# Patient Record
Sex: MALE | Race: WHITE | NOT HISPANIC OR LATINO | Employment: FULL TIME | URBAN - METROPOLITAN AREA
[De-identification: names, ages, dates, MRNs, and addresses within clinical notes are randomized per-mention and may not be internally consistent; named-entity substitution may affect disease eponyms.]

---

## 2023-12-23 ENCOUNTER — APPOINTMENT (OUTPATIENT)
Dept: RADIOLOGY | Facility: CLINIC | Age: 43
End: 2023-12-23
Attending: EMERGENCY MEDICINE
Payer: COMMERCIAL

## 2023-12-23 ENCOUNTER — HOSPITAL ENCOUNTER (EMERGENCY)
Facility: CLINIC | Age: 43
Discharge: HOME | End: 2023-12-23
Attending: EMERGENCY MEDICINE | Admitting: EMERGENCY MEDICINE
Payer: COMMERCIAL

## 2023-12-23 VITALS
WEIGHT: 280 LBS | HEART RATE: 82 BPM | BODY MASS INDEX: 34.1 KG/M2 | RESPIRATION RATE: 18 BRPM | SYSTOLIC BLOOD PRESSURE: 134 MMHG | HEIGHT: 76 IN | OXYGEN SATURATION: 97 % | DIASTOLIC BLOOD PRESSURE: 78 MMHG | TEMPERATURE: 97.9 F

## 2023-12-23 DIAGNOSIS — I48.3 TYPICAL ATRIAL FLUTTER (H): Primary | ICD-10-CM

## 2023-12-23 DIAGNOSIS — I48.92 ATRIAL FLUTTER WITH RAPID VENTRICULAR RESPONSE (H): ICD-10-CM

## 2023-12-23 LAB
ANION GAP SERPL CALCULATED.3IONS-SCNC: 11 MMOL/L (ref 7–15)
ATRIAL RATE - MUSE: 322 BPM
BASOPHILS # BLD AUTO: 0.1 10E3/UL (ref 0–0.2)
BASOPHILS NFR BLD AUTO: 1 %
BUN SERPL-MCNC: 10.8 MG/DL (ref 6–20)
CALCIUM SERPL-MCNC: 9.9 MG/DL (ref 8.6–10)
CHLORIDE SERPL-SCNC: 105 MMOL/L (ref 98–107)
CREAT SERPL-MCNC: 1.1 MG/DL (ref 0.67–1.17)
DEPRECATED HCO3 PLAS-SCNC: 26 MMOL/L (ref 22–29)
DIASTOLIC BLOOD PRESSURE - MUSE: 99 MMHG
EGFRCR SERPLBLD CKD-EPI 2021: 85 ML/MIN/1.73M2
EOSINOPHIL # BLD AUTO: 0.1 10E3/UL (ref 0–0.7)
EOSINOPHIL NFR BLD AUTO: 1 %
ERYTHROCYTE [DISTWIDTH] IN BLOOD BY AUTOMATED COUNT: 11.9 % (ref 10–15)
GLUCOSE SERPL-MCNC: 119 MG/DL (ref 70–99)
HCT VFR BLD AUTO: 49.9 % (ref 40–53)
HGB BLD-MCNC: 17.7 G/DL (ref 13.3–17.7)
HOLD SPECIMEN: NORMAL
HOLD SPECIMEN: NORMAL
IMM GRANULOCYTES # BLD: 0 10E3/UL
IMM GRANULOCYTES NFR BLD: 0 %
INTERPRETATION ECG - MUSE: NORMAL
LYMPHOCYTES # BLD AUTO: 2.2 10E3/UL (ref 0.8–5.3)
LYMPHOCYTES NFR BLD AUTO: 22 %
MAGNESIUM SERPL-MCNC: 2.1 MG/DL (ref 1.7–2.3)
MCH RBC QN AUTO: 32 PG (ref 26.5–33)
MCHC RBC AUTO-ENTMCNC: 35.5 G/DL (ref 31.5–36.5)
MCV RBC AUTO: 90 FL (ref 78–100)
MONOCYTES # BLD AUTO: 0.5 10E3/UL (ref 0–1.3)
MONOCYTES NFR BLD AUTO: 5 %
NEUTROPHILS # BLD AUTO: 7.1 10E3/UL (ref 1.6–8.3)
NEUTROPHILS NFR BLD AUTO: 71 %
NRBC # BLD AUTO: 0 10E3/UL
NRBC BLD AUTO-RTO: 0 /100
P AXIS - MUSE: 256 DEGREES
PLATELET # BLD AUTO: 223 10E3/UL (ref 150–450)
POTASSIUM SERPL-SCNC: 4 MMOL/L (ref 3.4–5.3)
PR INTERVAL - MUSE: NORMAL MS
QRS DURATION - MUSE: 80 MS
QT - MUSE: 228 MS
QTC - MUSE: 367 MS
R AXIS - MUSE: -64 DEGREES
RBC # BLD AUTO: 5.53 10E6/UL (ref 4.4–5.9)
SODIUM SERPL-SCNC: 142 MMOL/L (ref 135–145)
SYSTOLIC BLOOD PRESSURE - MUSE: 140 MMHG
T AXIS - MUSE: 6 DEGREES
TROPONIN T SERPL HS-MCNC: 11 NG/L
TSH SERPL DL<=0.005 MIU/L-ACNC: 2.09 UIU/ML (ref 0.3–4.2)
VENTRICULAR RATE- MUSE: 156 BPM
WBC # BLD AUTO: 10 10E3/UL (ref 4–11)

## 2023-12-23 PROCEDURE — 83735 ASSAY OF MAGNESIUM: CPT | Performed by: EMERGENCY MEDICINE

## 2023-12-23 PROCEDURE — 96374 THER/PROPH/DIAG INJ IV PUSH: CPT

## 2023-12-23 PROCEDURE — 84443 ASSAY THYROID STIM HORMONE: CPT | Performed by: EMERGENCY MEDICINE

## 2023-12-23 PROCEDURE — 80048 BASIC METABOLIC PNL TOTAL CA: CPT | Performed by: EMERGENCY MEDICINE

## 2023-12-23 PROCEDURE — 93005 ELECTROCARDIOGRAM TRACING: CPT | Performed by: EMERGENCY MEDICINE

## 2023-12-23 PROCEDURE — 99285 EMERGENCY DEPT VISIT HI MDM: CPT | Mod: 25

## 2023-12-23 PROCEDURE — 99152 MOD SED SAME PHYS/QHP 5/>YRS: CPT

## 2023-12-23 PROCEDURE — 92960 CARDIOVERSION ELECTRIC EXT: CPT

## 2023-12-23 PROCEDURE — 71046 X-RAY EXAM CHEST 2 VIEWS: CPT

## 2023-12-23 PROCEDURE — 258N000003 HC RX IP 258 OP 636: Performed by: EMERGENCY MEDICINE

## 2023-12-23 PROCEDURE — 84484 ASSAY OF TROPONIN QUANT: CPT | Performed by: EMERGENCY MEDICINE

## 2023-12-23 PROCEDURE — 999N000157 HC STATISTIC RCP TIME EA 10 MIN

## 2023-12-23 PROCEDURE — 250N000011 HC RX IP 250 OP 636: Performed by: EMERGENCY MEDICINE

## 2023-12-23 PROCEDURE — 36415 COLL VENOUS BLD VENIPUNCTURE: CPT | Performed by: EMERGENCY MEDICINE

## 2023-12-23 PROCEDURE — 85025 COMPLETE CBC W/AUTO DIFF WBC: CPT | Performed by: EMERGENCY MEDICINE

## 2023-12-23 PROCEDURE — 96361 HYDRATE IV INFUSION ADD-ON: CPT | Mod: 59

## 2023-12-23 RX ORDER — ETOMIDATE 2 MG/ML
10 INJECTION INTRAVENOUS ONCE
Status: DISCONTINUED | OUTPATIENT
Start: 2023-12-23 | End: 2023-12-23 | Stop reason: HOSPADM

## 2023-12-23 RX ORDER — PROPOFOL 10 MG/ML
INJECTION, EMULSION INTRAVENOUS
Status: DISCONTINUED
Start: 2023-12-23 | End: 2023-12-23 | Stop reason: HOSPADM

## 2023-12-23 RX ORDER — PROPOFOL 10 MG/ML
INJECTION, EMULSION INTRAVENOUS PRN
Status: COMPLETED | OUTPATIENT
Start: 2023-12-23 | End: 2023-12-23

## 2023-12-23 RX ORDER — DILTIAZEM HYDROCHLORIDE 5 MG/ML
15 INJECTION INTRAVENOUS ONCE
Status: COMPLETED | OUTPATIENT
Start: 2023-12-23 | End: 2023-12-23

## 2023-12-23 RX ADMIN — PROPOFOL 30 MG: 10 INJECTION, EMULSION INTRAVENOUS at 17:43

## 2023-12-23 RX ADMIN — PROPOFOL 30 MG: 10 INJECTION, EMULSION INTRAVENOUS at 17:47

## 2023-12-23 RX ADMIN — PROPOFOL 30 MG: 10 INJECTION, EMULSION INTRAVENOUS at 17:41

## 2023-12-23 RX ADMIN — PROPOFOL 30 MG: 10 INJECTION, EMULSION INTRAVENOUS at 17:42

## 2023-12-23 RX ADMIN — PROPOFOL 30 MG: 10 INJECTION, EMULSION INTRAVENOUS at 17:44

## 2023-12-23 RX ADMIN — PROPOFOL 30 MG: 10 INJECTION, EMULSION INTRAVENOUS at 17:45

## 2023-12-23 RX ADMIN — PROPOFOL 30 MG: 10 INJECTION, EMULSION INTRAVENOUS at 17:39

## 2023-12-23 RX ADMIN — PROPOFOL 30 MG: 10 INJECTION, EMULSION INTRAVENOUS at 17:40

## 2023-12-23 RX ADMIN — SODIUM CHLORIDE 1000 ML: 9 INJECTION, SOLUTION INTRAVENOUS at 15:37

## 2023-12-23 RX ADMIN — PROPOFOL 30 MG: 10 INJECTION, EMULSION INTRAVENOUS at 17:46

## 2023-12-23 RX ADMIN — PROPOFOL 60 MG: 10 INJECTION, EMULSION INTRAVENOUS at 17:37

## 2023-12-23 RX ADMIN — DILTIAZEM HYDROCHLORIDE 15 MG: 5 INJECTION INTRAVENOUS at 15:59

## 2023-12-23 RX ADMIN — PROPOFOL 30 MG: 10 INJECTION, EMULSION INTRAVENOUS at 17:48

## 2023-12-23 ASSESSMENT — ACTIVITIES OF DAILY LIVING (ADL): ADLS_ACUITY_SCORE: 35

## 2023-12-23 NOTE — ED NOTES
Patient Discharged to home. Discharge instructions reviewed and signed by the patient. All personal belongings removed from the room.   Shay Marmolejo RN  12/23/2023  6:47 PM

## 2023-12-23 NOTE — ED PROVIDER NOTES
EMERGENCY DEPARTMENT ENCOUNTER      NAME: Krystian Ortega  AGE: 43 year old male  YOB: 1980  MRN: [id number]  EVALUATION DATE & TIME: 12/23/2023  3:24 PM    PCP: No primary care provider on file.    ED PROVIDER: Carloz Jay M.D.      Chief Complaint   Patient presents with    Shortness of Breath    Palpitations         FINAL IMPRESSION:  1. Atrial flutter with rapid ventricular response (H)          ED COURSE & MEDICAL DECISION MAKING:      3:33 pm I met with the patient.   4:39 PM I spoke to Dr. Garcia from cardiology regarding patient.   4:42 I updated patient on cardio findings and asking patient consent for cardioversion.   5:30 PM I performed a cardioversion on patient.   6:31 PM repeat exam is benign discussed findings and discharge close follow-up.    Pertinent Labs & Imaging studies reviewed. (See chart for details)  43 year old male presents to the Emergency Department for evaluation of palpitations, shortness of breath. Patient appears non toxic with stable vitals signs, patient afebrile, he is tachycardic, no hypoxia.  Exam significant for tachycardia though regular rhythm.  Patient denies chest pain, pleurisy, no reports of any ripping or tearing chest discomfort the back or shoulders, fevers, hemoptysis or productive cough.  Per review of the medical record did review office visit through [facility] on 2/20/2015 where patient was seen by family medicine for contraception counseling, desired vasectomy, otherwise no other significant past medical history.  Patient denies any tobacco use.  ECG obtained from triage consistent with atrial flutter, no ischemic changes.  We will obtain screening labs and a chest x-ray.  Patient states that symptoms started around 730 or 8:00 this morning.    Reassessment: Labs by my independent interpretation showed no acute concerning findings, troponin within normal limits with certainly nothing just ACS, no signs of anemia with a  hemoglobin 7.7 no signs of acute kidney injury with a creatinine of 1.10.  No signs of endocrine dysfunction with a pH of 2.09.  Chest x-ray by my independent interpretation showed no focal consolidation by report was negative.  Discussed patient case with cardiology and at this time they agree patient can be cardioverted and discharged with Xarelto and then close follow-up in the cardiology clinic.  Discussed these findings recommendations with the patient we did obtain written consent.  Bedside procedural sedation and synchronized cardioversion performed with return to normal sinus rhythm.  Patient was alert and awake, tolerating p.o. so will be discharged with Xarelto prescription and close follow-up with cardiology.  Discussed all these findings recommendations the patient felt reassured and comfortable discharge.  Return precautions provided.    Medical Decision Making    History:  Supplemental history from: Documented in chart, if applicable  External Record(s) reviewed: Documented in chart, if applicable.    Work Up:  Chart documentation includes differential considered and any EKGs or imaging independently interpreted by provider, where specified.  In additional to work up documented, I considered the following work up: Documented in chart, if applicable.    External consultation:  Discussion of management with another provider: Documented in chart, if applicable    Complicating factors:  Care impacted by chronic illness: N/A  Care affected by social determinants of health: N/A    Disposition considerations: Discharge. I prescribed additional prescription strength medication(s) as charted. See documentation for any additional details.          At the conclusion of the encounter I discussed the results of all of the tests and the disposition. The questions were answered and return precautions provided. The patient or family acknowledged understanding and was agreeable with the care plan.         MEDICATIONS  "GIVEN IN THE EMERGENCY:  Medications   sodium chloride 0.9% BOLUS 1,000 mL (0 mLs Intravenous Stopped 12/23/23 1834)   diltiazem (CARDIZEM) injection 15 mg (15 mg Intravenous $Given 12/23/23 6672)   propofol (DIPRIVAN) injection 10 mg/mL vial (30 mg Intravenous $Given 12/23/23 1743)       NEW PRESCRIPTIONS STARTED AT TODAY'S ER VISIT  Discharge Medication List as of 12/23/2023  6:36 PM        START taking these medications    Details   Rivaroxaban ANTICOAGULANT 15 & 20 MG TBPK Starter Therapy Pack Take 15 mg by mouth 2 times daily (with meals) for 21 days, THEN 20 mg daily with food for 9 days., Disp-51 each, R-0, Local Print                  =================================================================    HPI    Patient information was obtained from: Patient    Use of Intrepreter: N/A      Krystian Ortega is a 43 year old male who presents to the ED via walk-in for evaluation of shortness of breath and palpitations.     The patient reports around 7:30 - 8 am this morning, he started endorsing heart palpitations and felt out of breath. Patient states he has symptoms like this in the past and they usually go away after a couple of hours. Today, the symptoms were persistent and subsided for a bit. In the afternoon, the patient reports walking up the stairs and feeling like he \"almost passed out\". He also endorses lightheadedness. Patient states the frequency of his symptoms occurs 5- 6 times a year and would last 1-2 hours. He is eating and drinking normally. Patient last ate 2-3 hours ago. This is his first time coming in for the symptoms. No medication taken and no urinary symptoms. No hx of diabetes, heart disease or lung disease. No tobacco use.  Patient denies any fever or pain. No other complaints right now.       REVIEW OF SYSTEMS   Constitutional:  Denies fever, chills  Respiratory:  Denies productive cough. Positive for shortness of breath.   Cardiovascular:  Denies chest pain. Positive for " palpitations  GI:  Denies abdominal pain, nausea, vomiting, or change in bowel or bladder habits   Musculoskeletal:  Denies any new muscle/joint swelling  Skin:  Denies rash   Neurologic:  Denies focal weakness. Positive for lightheadedness.   All systems negative except as marked.     PAST MEDICAL HISTORY:  History reviewed. No pertinent past medical history.    PAST SURGICAL HISTORY:  Past Surgical History:   Procedure Laterality Date    ORCHIOPEXY           CURRENT MEDICATIONS:    Prior to Admission medications    Medication Sig Start Date End Date Taking? Authorizing Provider   LORazepam (ATIVAN) 1 MG tablet [LORAZEPAM (ATIVAN) 1 MG TABLET] take 1 tablet 1-2 hours prior to procedure 2/20/15   Barney Cedeno MD        ALLERGIES:  No Known Allergies    FAMILY HISTORY:  Family History   Problem Relation Age of Onset    No Known Problems Mother     No Known Problems Father     No Known Problems Sister     No Known Problems Brother        SOCIAL HISTORY:   Social History     Socioeconomic History    Marital status:    Tobacco Use    Smoking status: Never   Substance and Sexual Activity    Alcohol use: Yes     Alcohol/week: 4.2 - 8.3 standard drinks of alcohol    Drug use: Yes     Comment: Drug use: nicotine tablets       VITALS:  Patient Vitals for the past 24 hrs:   BP Temp Temp src Pulse Resp SpO2 Height Weight   12/23/23 1835 -- 97.9  F (36.6  C) Oral -- -- -- -- --   12/23/23 1814 134/78 97.6  F (36.4  C) -- 82 18 97 % -- --   12/23/23 1802 118/75 -- -- -- 18 96 % -- --   12/23/23 1800 118/75 -- -- 86 25 96 % -- --   12/23/23 1757 120/73 -- -- 89 18 95 % -- --   12/23/23 1755 120/73 -- -- 92 (!) 32 96 % -- --   12/23/23 1745 118/69 -- -- (!) 147 21 96 % -- --   12/23/23 1735 (!) 158/82 -- -- (!) 146 18 100 % -- --   12/23/23 1730 -- -- -- -- 16 -- -- --   12/23/23 1730 137/82 -- -- (!) 145 24 100 % -- --   12/23/23 1728 -- -- -- (!) 151 19 99 % -- --   12/23/23 1725 133/82 -- -- (!) 149 24 99 % -- --  "  12/23/23 1717 133/65 98.6  F (37  C) Oral (!) 151 16 99 % -- --   12/23/23 1715 133/65 -- -- (!) 147 (!) 43 96 % -- --   12/23/23 1615 -- -- -- 88 17 99 % -- --   12/23/23 1610 -- -- -- 94 12 98 % -- --   12/23/23 1605 -- -- -- 108 30 95 % -- --   12/23/23 1600 (!) 138/95 -- -- (!) 149 22 97 % -- --   12/23/23 1555 -- -- -- (!) 155 24 98 % -- --   12/23/23 1550 -- -- -- (!) 156 17 96 % -- --   12/23/23 1545 (!) 154/110 -- -- (!) 155 24 98 % -- --   12/23/23 1540 -- -- -- (!) 159 24 98 % -- --   12/23/23 1535 -- -- -- (!) 157 14 99 % -- --   12/23/23 1530 (!) 150/110 -- -- (!) 160 27 99 % -- --   12/23/23 1525 (!) 140/99 98.4  F (36.9  C) Oral (!) 160 18 98 % 1.93 m (6' 4\") 127 kg (280 lb)        PHYSICAL EXAM    Constitutional:  Awake, alert, in no apparent distress  HENT:  Normocephalic, Atraumatic. Bilateral external ears normal. Oropharynx moist. Nose normal. Neck- Normal range of motion with no guarding, No midline cervical tenderness, Supple, No stridor.   Eyes:  PERRL, EOMI with no signs of entrapment, Conjunctiva normal, No discharge.   Respiratory:  Normal breath sounds, No respiratory distress, No wheezing.    Cardiovascular: Tachycardia with Normal rhythm, No appreciable rubs or gallops.   GI:  Soft, No tenderness, No distension, No palpable masses  Musculoskeletal:  Intact distal pulses, No edema. Good range of motion in all major joints. No tenderness to palpation or major deformities noted.  Integument:  Warm, Dry, No erythema, No rash.   Neurologic:  Alert & oriented, Normal motor function, Normal sensory function, No focal deficits noted.   Psychiatric:  Affect normal, Judgment normal, Mood normal.     LAB:  All pertinent labs reviewed and interpreted.  Results for orders placed or performed during the hospital encounter of 12/23/23   XR Chest 2 Views    Impression    IMPRESSION: Negative chest.   Basic metabolic panel   Result Value Ref Range    Sodium 142 135 - 145 mmol/L    Potassium 4.0 3.4 - " 5.3 mmol/L    Chloride 105 98 - 107 mmol/L    Carbon Dioxide (CO2) 26 22 - 29 mmol/L    Anion Gap 11 7 - 15 mmol/L    Urea Nitrogen 10.8 6.0 - 20.0 mg/dL    Creatinine 1.10 0.67 - 1.17 mg/dL    GFR Estimate 85 >60 mL/min/1.73m2    Calcium 9.9 8.6 - 10.0 mg/dL    Glucose 119 (H) 70 - 99 mg/dL   Result Value Ref Range    Troponin T, High Sensitivity 11 <=22 ng/L   TSH with free T4 reflex   Result Value Ref Range    TSH 2.09 0.30 - 4.20 uIU/mL   Extra Blue Top Tube   Result Value Ref Range    Hold Specimen JIC    Extra Red Top Tube   Result Value Ref Range    Hold Specimen JIC    CBC with platelets and differential   Result Value Ref Range    WBC Count 10.0 4.0 - 11.0 10e3/uL    RBC Count 5.53 4.40 - 5.90 10e6/uL    Hemoglobin 17.7 13.3 - 17.7 g/dL    Hematocrit 49.9 40.0 - 53.0 %    MCV 90 78 - 100 fL    MCH 32.0 26.5 - 33.0 pg    MCHC 35.5 31.5 - 36.5 g/dL    RDW 11.9 10.0 - 15.0 %    Platelet Count 223 150 - 450 10e3/uL    % Neutrophils 71 %    % Lymphocytes 22 %    % Monocytes 5 %    % Eosinophils 1 %    % Basophils 1 %    % Immature Granulocytes 0 %    NRBCs per 100 WBC 0 <1 /100    Absolute Neutrophils 7.1 1.6 - 8.3 10e3/uL    Absolute Lymphocytes 2.2 0.8 - 5.3 10e3/uL    Absolute Monocytes 0.5 0.0 - 1.3 10e3/uL    Absolute Eosinophils 0.1 0.0 - 0.7 10e3/uL    Absolute Basophils 0.1 0.0 - 0.2 10e3/uL    Absolute Immature Granulocytes 0.0 <=0.4 10e3/uL    Absolute NRBCs 0.0 10e3/uL   Result Value Ref Range    Magnesium 2.1 1.7 - 2.3 mg/dL   ECG 12-LEAD WITH MUSE (LHE)   Result Value Ref Range    Systolic Blood Pressure 140 mmHg    Diastolic Blood Pressure 99 mmHg    Ventricular Rate 156 BPM    Atrial Rate 322 BPM    NJ Interval  ms    QRS Duration 80 ms     ms    QTc 367 ms    P Axis 256 degrees    R AXIS -64 degrees    T Axis 6 degrees    Interpretation ECG       Atrial flutter with variable A-V block  Left axis deviation  Pulmonary disease pattern  Nonspecific ST abnormality  Abnormal ECG  No previous ECGs  available  Confirmed by SEE ED PROVIDER NOTE FOR, ECG INTERPRETATION ([id number]),  PATRIA AMIN ([id number]) on 12/23/2023 3:53:41 PM         RADIOLOGY:  XR Chest 2 Views   Final Result   IMPRESSION: Negative chest.             EKG:    Atrial flutter, no specific ST acute ischemic changes, no concerning dysrhythmias or interval prolongation, no priors for comparison    Repeat ECG shows sinus rhythm, incomplete right bundle branch block, no specific ST acute ischemic changes, no concerning dysrhythmias and for prolongation    I have independently reviewed and interpreted the EKG(s) documented above.    PROCEDURES:   PROCEDURE: Sedation   INDICATIONS: Sedation is required to allow for cardioversion   SEDATION PROVIDER: Dr Carloz Jay   PROCEDURE PROVIDER: Dr Carloz Jay   LEVEL OF SEDATION: Deep Sedation    Defined as:  Minimal = Normal response to verbal  Moderate = Responds to verbal and light tactile stimulation  Deep = Responds after repeated painful stimulation   CONSENT: Risks, benefits and alternatives were discussed with and Written consent was obtained from Patient.   PROCEDURE SPECIFIC CHECKLIST COMPLETED:   Yes   LAST ORAL INTAKE: Unknown   ASA CLASS: 1 - Healthy patient, no medical problems   MALLAMPATI:  I - Faucial pillars, soft palate, and uvula are visible   TIME OUT: Universal protocol was followed. TIME OUT conducted just prior to starting procedure confirmed patient identity, site/side, procedure, patient position, and availability of correct equipment. Yes    Immediately prior to initiation of sedation, reassessment of clinical condition was performed which was unchanged.   MEDICATIONS: Etomidate, 10 mg, IV and Propofol, 300 mg, IV   MONITORING: Monitoring consisted of:   heart rate, cardiac monitor, continuous pulse oximeter, continuous capnometry (end tidal CO2), frequent blood pressure checks, level of consciousness checks, IV access, constant attendance by RN until patient is  recovered, and constant attendance by MD until patient is stable   RESPONSE: vital signs stable, airway patent, and O2 saturations remained >92%   POST-SEDATION ASSESSMENT/NOTE: Lowest level oxygen saturation reached was 94%.    Post procedure patient was alert and responds to verbal stimuli    Patient was monitored during recovery and returned to pre-procedure baseline.   ADDITIONAL MD ASSISTANCE: None   TOTAL MD DRUG ADMINISTRATION / MONITORING TIME: 30 minutes   COMPLICATIONS:  Patient tolerated procedure well, without complication         PROCEDURE: Electrical Cardioversion with Procedural Sedation   INDICATIONS: Sedation is required to allow for Cardioversion for Atrial Flutter    CARDIOVERSION TYPE: Biphasic, External   SEDATION PROVIDER: Dr Carloz Jay   CARDIOVERSION PROVIDER: Dr Carloz Jay   LEVEL OF SEDATION: Deep Sedation    Defined as:  Minimal = Normal response to verbal  Moderate = Responds to verbal and light tactile stimulation  Deep = Responds after repeated painful stimulation   CONSENT: Risks, benefits and alternatives were discussed with and Written consent was obtained from Patient.   PROCEDURE SPECIFIC CHECKLIST COMPLETED: Yes   LAST ORAL INTAKE: Unknown   ASA CLASS: 1 - Healthy patient, no medical problems   MALLAMPATI:  I - Faucial pillars, soft palate, and uvula are visible   TIME OUT: Universal protocol was followed. TIME OUT conducted just prior to starting procedure confirmed patient identity, site/side, procedure, patient position, and availability of correct equipment. Yes    Immediately prior to initiation of sedation, reassessment of clinical condition was performed which was unchanged.   MEDICATIONS GIVEN: Etomidate, 10 mg, IV and Propofol,   mg, IV    MONITORING: heart rate, cardiac monitor, continuous pulse oximeter, continuous capnometry (end tidal CO2), frequent blood pressure checks, level of consciousness checks, IV access, constant attendance by RN until patient is  recovered, and constant attendance by MD until patient is stable   RESPONSE: vital signs stable, airway patent, and O2 saturations remained >92%   POST-SEDATION ASSESSMENT/PROCEDURE NOTE: CARDIOVERSION: Trial 1: Synchronized shock at 175 joules was Successful    SEDATION:  Lowest level oxygen saturation reached was 94%.    Post procedure patient was alert and responds to verbal stimuli    Patient was monitored during recovery and returned to pre-procedure baseline.   TOTAL MD DRUG ADMINISTRATION / MONITORING TIME: 30 minutes.   COMPLICATIONS: Patient tolerated procedure well, without complication        [employer] Documentation:       I, Afshan Cavanaugh, am serving as a scribe to document services personally performed by Carloz Jay MD, based on my observation and the provider's statements to me. I, Carloz Jay MD attest that Afshan Cavanaugh is acting in a scribe capacity, has observed my performance of the services and has documented them in accordance with my direction.    Carloz Jay M.D.  Emergency Medicine  [facility] EMERGENCY ROOM  [address] [place] [id number]  [phone]  Dept: [phone]      Carloz Jay MD  12/23/23 3728

## 2023-12-23 NOTE — ED TRIAGE NOTES
Patient presents to ED with SOB and palpitations and lightheadedness that he first noticed this morning when he woke up at 0730 this morning, no cardiac hx.  Reyna Holly RN.......12/23/2023 3:32 PM     Triage Assessment (Adult)       Row Name 12/23/23 1530          Triage Assessment    Airway WDL WDL        Respiratory WDL    Respiratory WDL WDL        Skin Circulation/Temperature WDL    Skin Circulation/Temperature WDL WDL        Cardiac WDL    Cardiac WDL X;rhythm     Cardiac Rhythm Atrial flutter        Peripheral/Neurovascular WDL    Peripheral Neurovascular WDL WDL        Cognitive/Neuro/Behavioral WDL    Cognitive/Neuro/Behavioral WDL WDL

## 2023-12-29 LAB
ATRIAL RATE - MUSE: 93 BPM
DIASTOLIC BLOOD PRESSURE - MUSE: 66 MMHG
INTERPRETATION ECG - MUSE: NORMAL
P AXIS - MUSE: 55 DEGREES
PR INTERVAL - MUSE: 158 MS
QRS DURATION - MUSE: 96 MS
QT - MUSE: 346 MS
QTC - MUSE: 430 MS
R AXIS - MUSE: -39 DEGREES
SYSTOLIC BLOOD PRESSURE - MUSE: 106 MMHG
T AXIS - MUSE: 23 DEGREES
VENTRICULAR RATE- MUSE: 93 BPM

## 2023-12-29 NOTE — ED NOTES
Placed orders for EKG.  Date of exam was on 12/23/2023 at 17:50  Josy Yoedr RN 12/29/2023 6:13 AM

## 2024-01-10 ENCOUNTER — OFFICE VISIT (OUTPATIENT)
Dept: CARDIOLOGY | Facility: CLINIC | Age: 44
End: 2024-01-10
Payer: COMMERCIAL

## 2024-01-10 VITALS
WEIGHT: 292 LBS | DIASTOLIC BLOOD PRESSURE: 90 MMHG | HEART RATE: 62 BPM | HEIGHT: 76 IN | SYSTOLIC BLOOD PRESSURE: 144 MMHG | BODY MASS INDEX: 35.56 KG/M2 | RESPIRATION RATE: 18 BRPM

## 2024-01-10 DIAGNOSIS — R06.09 EXERTIONAL DYSPNEA: ICD-10-CM

## 2024-01-10 DIAGNOSIS — E66.09 CLASS 2 OBESITY DUE TO EXCESS CALORIES WITHOUT SERIOUS COMORBIDITY WITH BODY MASS INDEX (BMI) OF 35.0 TO 35.9 IN ADULT: ICD-10-CM

## 2024-01-10 DIAGNOSIS — R00.2 PALPITATIONS: ICD-10-CM

## 2024-01-10 DIAGNOSIS — I48.4 ATYPICAL ATRIAL FLUTTER (H): Primary | ICD-10-CM

## 2024-01-10 DIAGNOSIS — E66.812 CLASS 2 OBESITY DUE TO EXCESS CALORIES WITHOUT SERIOUS COMORBIDITY WITH BODY MASS INDEX (BMI) OF 35.0 TO 35.9 IN ADULT: ICD-10-CM

## 2024-01-10 PROCEDURE — G2211 COMPLEX E/M VISIT ADD ON: HCPCS | Performed by: INTERNAL MEDICINE

## 2024-01-10 PROCEDURE — 99204 OFFICE O/P NEW MOD 45 MIN: CPT | Performed by: INTERNAL MEDICINE

## 2024-01-10 RX ORDER — METOPROLOL TARTRATE 25 MG/1
25 TABLET, FILM COATED ORAL 2 TIMES DAILY PRN
Qty: 30 TABLET | Refills: 1 | Status: SHIPPED | OUTPATIENT
Start: 2024-01-10 | End: 2024-02-23 | Stop reason: ALTCHOICE

## 2024-01-10 NOTE — PROGRESS NOTES
"  HEART CARE ENCOUNTER CONSULTATON NOTE      [facility]  [phone]      Assessment/Recommendations   Assessment:   Atrial flutter, possible atypical, rate 156 bpm.   2.  Elevated blood pressure  3.  Intermittent palpitations secondary #1  4.  Obesity, BMI 35  5.  Exertional dyspnea    Plan:   Echocardiogram   2.   As needed metoprolol   3.  Discussed for ablation strategy if ongoing episodes  4.  Continue anticoagulation for 4 weeks given cardioversion, [id number] of 0  5.  Patient will obtain a cardia mobile to determine cause of intermittent episodes of palpitations.         History of Present Illness/Subjective    HPI: Krystian Ortega is a 43 year old male no prior cardiovascular history presents to cardiology clinic after recent emergency room visit on 12/23/2023 for new onset atrial flutter requiring cardioversion in the ED.    In the morning of 12/23/2023 the patient developed rapid palpitations associated with lightheadedness.  He had no chest pain.  Did have some mild dyspnea when ambulating up the stairs that day.    Given persistent symptoms he presented to the emergency department.  There is noted to be in narrow complex tachycardia with heart rate of 156 bpm.  Peers to be atrial flutter possible atypical.    He underwent successful cardioversion after not responding to IV push of metoprolol.    He was placed on anticoagulation appropriately.  Since undergoing cardioversion he has noticed a few episodes of palpitations.  Did note occasional dyspnea with ambulating stairs.    Echocardiogram Results: Pending       Physical Examination  Review of Systems   Vitals: BP (!) 144/90 (BP Location: Left arm, Patient Position: Sitting, Cuff Size: Adult Large)   Pulse 62   Resp 18   Ht 1.93 m (6' 4\")   Wt 132.5 kg (292 lb)   BMI 35.54 kg/m    BMI= Body mass index is 35.54 kg/m .  Wt Readings from Last 3 Encounters:   01/10/24 132.5 kg (292 lb)   12/23/23 127 kg (280 lb)   03/20/15 122 kg " (269 lb)        Pleasant, mild obesity   ENT/Mouth: membranes moist, no oral lesions or bleeding gums.      EYES:  no scleral icterus, normal conjunctivae       Chest/Lungs:   lungs are clear to auscultation, no rales or wheezing,equal chest wall expansion    Cardiovascular:   Regular. Normal first and second heart sounds with no murmurs, rubs, or gallops; the carotid, radial and posterior tibial pulses are intact, no edema bilaterally    Abdomen:     Extremities: no cyanosis or clubbing   Skin: no xanthelasma, warm.    Neurologic: normal  bilateral, no tremors     Psychiatric: alert and oriented x3, calm        Please refer above for cardiac ROS details.        Medical History  Surgical History Family History Social History   No past medical history on file.  Past Surgical History:   Procedure Laterality Date     ORCHIOPEXY       Family History   Problem Relation Age of Onset     No Known Problems Mother      No Known Problems Father      No Known Problems Sister      No Known Problems Brother         Social History     Socioeconomic History     Marital status:      Spouse name: Not on file     Number of children: Not on file     Years of education: Not on file     Highest education level: Not on file   Occupational History     Not on file   Tobacco Use     Smoking status: Never     Smokeless tobacco: Former   Substance and Sexual Activity     Alcohol use: Yes     Alcohol/week: 4.2 - 8.3 standard drinks of alcohol     Drug use: Yes     Comment: Drug use: nicotine tablets     Sexual activity: Not on file   Other Topics Concern     Not on file   Social History Narrative     Not on file     Social Determinants of Health     Financial Resource Strain: Not on file   Food Insecurity: Not on file   Transportation Needs: Not on file   Physical Activity: Not on file   Stress: Not on file   Social Connections: Not on file   Interpersonal Safety: Not on file   Housing Stability: Not on file           Medications   "Allergies   Current Outpatient Medications   Medication Sig Dispense Refill     Rivaroxaban ANTICOAGULANT 15 & 20 MG TBPK Starter Therapy Pack Take 15 mg by mouth 2 times daily (with meals) for 21 days, THEN 20 mg daily with food for 9 days. 51 each 0     LORazepam (ATIVAN) 1 MG tablet [LORAZEPAM (ATIVAN) 1 MG TABLET] take 1 tablet 1-2 hours prior to procedure (Patient not taking: Reported on 1/10/2024) 1 tablet 0     No Known Allergies       Lab Results    Chemistry/lipid CBC Cardiac Enzymes/BNP/TSH/INR   No results for input(s): \"CHOL\", \"HDL\", \"LDL\", \"TRIG\", \"CHOLHDLRATIO\" in the last 08188 hours.  No results for input(s): \"LDL\" in the last 39208 hours.  Recent Labs   Lab Test 12/23/23  1534      POTASSIUM 4.0   CHLORIDE 105   CO2 26   *   BUN 10.8   CR 1.10   GFRESTIMATED 85   ESDRAS 9.9     Recent Labs   Lab Test 12/23/23  1534   CR 1.10     No results for input(s): \"A1C\" in the last 65532 hours.       Recent Labs   Lab Test 12/23/23  1534   WBC 10.0   HGB 17.7   HCT 49.9   MCV 90        Recent Labs   Lab Test 12/23/23  1534   HGB 17.7    No results for input(s): \"TROPONINI\" in the last 08990 hours.  No results for input(s): \"BNP\", \"NTBNPI\", \"NTBNP\" in the last 82529 hours.  Recent Labs   Lab Test 12/23/23  1534   TSH 2.09     No results for input(s): \"INR\" in the last 75089 hours.     Arnulfo Malagon DO    Patient requires ongoing monitoring of his atrial arrhythmia.  Will obtain echocardiogram to assess left ventricular function.  If normal left ventricular ejection fraction will not proceed with ablation at this time.  If you notice a decline in left ventricular ejection fraction would recommend further testing with stress testing and consideration for ablation.  Longitudinal care will be coordinated as testing results return.                                  "

## 2024-01-10 NOTE — PATIENT INSTRUCTIONS
Please contact direct nurses line Monday through Friday 8 AM to 5 PM @ [phone]    After-hours contact cardiology office at [phone].    Plan:   Continue Blood thinner until completed  2.   Metoprolol as needed   3.  Complete echo  4. Obtain [employer].

## 2024-01-10 NOTE — LETTER
1/10/2024    Barney Cedeno MD  [address]  [place] [address]    RE: Krystian Ortega       Dear Colleague,     I had the pleasure of seeing Krystian Ortega in the [facility].    HEART CARE ENCOUNTER CONSULTATON NOTE      [facility]  [phone]      Assessment/Recommendations   Assessment:   Atrial flutter, possible atypical, rate 156 bpm.   2.  Elevated blood pressure  3.  Intermittent palpitations secondary #1  4.  Obesity, BMI 35  5.  Exertional dyspnea    Plan:   Echocardiogram   2.   As needed metoprolol   3.  Discussed for ablation strategy if ongoing episodes  4.  Continue anticoagulation for 4 weeks given cardioversion, [id number] of 0  5.  Patient will obtain a cardia mobile to determine cause of intermittent episodes of palpitations.         History of Present Illness/Subjective    HPI: Krystian Ortega is a 43 year old male no prior cardiovascular history presents to cardiology clinic after recent emergency room visit on 12/23/2023 for new onset atrial flutter requiring cardioversion in the ED.    In the morning of 12/23/2023 the patient developed rapid palpitations associated with lightheadedness.  He had no chest pain.  Did have some mild dyspnea when ambulating up the stairs that day.    Given persistent symptoms he presented to the emergency department.  There is noted to be in narrow complex tachycardia with heart rate of 156 bpm.  Peers to be atrial flutter possible atypical.    He underwent successful cardioversion after not responding to IV push of metoprolol.    He was placed on anticoagulation appropriately.  Since undergoing cardioversion he has noticed a few episodes of palpitations.  Did note occasional dyspnea with ambulating stairs.    Echocardiogram Results: Pending       Physical Examination  Review of Systems   Vitals: BP (!) 144/90 (BP Location: Left arm, Patient Position: Sitting, Cuff Size: Adult Large)   Pulse 62   Resp 18   Ht  "1.93 m (6' 4\")   Wt 132.5 kg (292 lb)   BMI 35.54 kg/m    BMI= Body mass index is 35.54 kg/m .  Wt Readings from Last 3 Encounters:   01/10/24 132.5 kg (292 lb)   12/23/23 127 kg (280 lb)   03/20/15 122 kg (269 lb)        Pleasant, mild obesity   ENT/Mouth: membranes moist, no oral lesions or bleeding gums.      EYES:  no scleral icterus, normal conjunctivae       Chest/Lungs:   lungs are clear to auscultation, no rales or wheezing,equal chest wall expansion    Cardiovascular:   Regular. Normal first and second heart sounds with no murmurs, rubs, or gallops; the carotid, radial and posterior tibial pulses are intact, no edema bilaterally    Abdomen:     Extremities: no cyanosis or clubbing   Skin: no xanthelasma, warm.    Neurologic: normal  bilateral, no tremors     Psychiatric: alert and oriented x3, calm        Please refer above for cardiac ROS details.        Medical History  Surgical History Family History Social History   No past medical history on file.  Past Surgical History:   Procedure Laterality Date    ORCHIOPEXY       Family History   Problem Relation Age of Onset    No Known Problems Mother     No Known Problems Father     No Known Problems Sister     No Known Problems Brother         Social History     Socioeconomic History    Marital status:      Spouse name: Not on file    Number of children: Not on file    Years of education: Not on file    Highest education level: Not on file   Occupational History    Not on file   Tobacco Use    Smoking status: Never    Smokeless tobacco: Former   Substance and Sexual Activity    Alcohol use: Yes     Alcohol/week: 4.2 - 8.3 standard drinks of alcohol    Drug use: Yes     Comment: Drug use: nicotine tablets    Sexual activity: Not on file   Other Topics Concern    Not on file   Social History Narrative    Not on file     Social Determinants of Health     Financial Resource Strain: Not on file   Food Insecurity: Not on file   Transportation Needs: Not " "on file   Physical Activity: Not on file   Stress: Not on file   Social Connections: Not on file   Interpersonal Safety: Not on file   Housing Stability: Not on file           Medications  Allergies   Current Outpatient Medications   Medication Sig Dispense Refill    Rivaroxaban ANTICOAGULANT 15 & 20 MG TBPK Starter Therapy Pack Take 15 mg by mouth 2 times daily (with meals) for 21 days, THEN 20 mg daily with food for 9 days. 51 each 0    LORazepam (ATIVAN) 1 MG tablet [LORAZEPAM (ATIVAN) 1 MG TABLET] take 1 tablet 1-2 hours prior to procedure (Patient not taking: Reported on 1/10/2024) 1 tablet 0     No Known Allergies       Lab Results    Chemistry/lipid CBC Cardiac Enzymes/BNP/TSH/INR   No results for input(s): \"CHOL\", \"HDL\", \"LDL\", \"TRIG\", \"CHOLHDLRATIO\" in the last 69176 hours.  No results for input(s): \"LDL\" in the last 79479 hours.  Recent Labs   Lab Test 12/23/23  1534      POTASSIUM 4.0   CHLORIDE 105   CO2 26   *   BUN 10.8   CR 1.10   GFRESTIMATED 85   ESDRAS 9.9     Recent Labs   Lab Test 12/23/23  1534   CR 1.10     No results for input(s): \"A1C\" in the last 33542 hours.       Recent Labs   Lab Test 12/23/23  1534   WBC 10.0   HGB 17.7   HCT 49.9   MCV 90        Recent Labs   Lab Test 12/23/23  1534   HGB 17.7    No results for input(s): \"TROPONINI\" in the last 35647 hours.  No results for input(s): \"BNP\", \"NTBNPI\", \"NTBNP\" in the last 47979 hours.  Recent Labs   Lab Test 12/23/23  1534   TSH 2.09     No results for input(s): \"INR\" in the last 47547 hours.     Arnulfo Malagon DO    Patient requires ongoing monitoring of his atrial arrhythmia.  Will obtain echocardiogram to assess left ventricular function.  If normal left ventricular ejection fraction will not proceed with ablation at this time.  If you notice a decline in left ventricular ejection fraction would recommend further testing with stress testing and consideration for ablation.  Longitudinal care will be coordinated as " testing results return.     Thank you for allowing me to participate in the care of your patient.      Sincerely,     Arnulfo Malagon [facility]  cc:   Carloz Jay MD  [address] [place] [address]

## 2024-01-19 ENCOUNTER — HOSPITAL ENCOUNTER (OUTPATIENT)
Dept: CARDIOLOGY | Facility: CLINIC | Age: 44
Discharge: HOME | End: 2024-01-19
Attending: INTERNAL MEDICINE | Admitting: INTERNAL MEDICINE
Payer: COMMERCIAL

## 2024-01-19 DIAGNOSIS — I48.4 ATYPICAL ATRIAL FLUTTER (H): ICD-10-CM

## 2024-01-19 LAB — LVEF ECHO: NORMAL

## 2024-01-19 PROCEDURE — 93306 TTE W/DOPPLER COMPLETE: CPT | Mod: 26 | Performed by: INTERNAL MEDICINE

## 2024-01-19 PROCEDURE — 93306 TTE W/DOPPLER COMPLETE: CPT

## 2024-01-22 NOTE — RESULT ENCOUNTER NOTE
Echocardiogram demonstrated evidence of a chronically elevated blood pressure.  He has mild left ventricular hypertrophy.  Mild left atrial enlargement which is likely driving his atrial flutter.  He also has mild enlargement of his ascending aorta.  I recommend that we start him on hydrochlorothiazide with losartan.  Recommended dosing would be losartan 25 with hydrochlorothiazide 12.5 mg daily.  Check BMP in 1 week.  He needs adequate control of his blood pressure prevent further dilation of his aorta.  She follow-up with general cardiology ALBERTO in 1 month.

## 2024-01-23 DIAGNOSIS — R06.09 EXERTIONAL DYSPNEA: ICD-10-CM

## 2024-01-23 DIAGNOSIS — E66.812 CLASS 2 OBESITY DUE TO EXCESS CALORIES WITHOUT SERIOUS COMORBIDITY WITH BODY MASS INDEX (BMI) OF 35.0 TO 35.9 IN ADULT: ICD-10-CM

## 2024-01-23 DIAGNOSIS — R00.2 PALPITATIONS: ICD-10-CM

## 2024-01-23 DIAGNOSIS — I51.7 LEFT VENTRICULAR HYPERTROPHY: Primary | ICD-10-CM

## 2024-01-23 DIAGNOSIS — E66.09 CLASS 2 OBESITY DUE TO EXCESS CALORIES WITHOUT SERIOUS COMORBIDITY WITH BODY MASS INDEX (BMI) OF 35.0 TO 35.9 IN ADULT: ICD-10-CM

## 2024-01-23 RX ORDER — HYDROCHLOROTHIAZIDE 12.5 MG/1
12.5 TABLET ORAL DAILY
Qty: 30 TABLET | Refills: 11 | Status: SHIPPED | OUTPATIENT
Start: 2024-01-23

## 2024-01-23 RX ORDER — LOSARTAN POTASSIUM 25 MG/1
25 TABLET ORAL DAILY
Qty: 30 TABLET | Refills: 11 | Status: SHIPPED | OUTPATIENT
Start: 2024-01-23

## 2024-01-23 NOTE — PROGRESS NOTES
PC with patient, and review of results and recommendations. Pt agreeable to start medication as advised. Sent Rx to verified pharmacy location. BMP lab and follow-up ordered. Offered direct transfer to schedulers to arrange, declined. Requested call back.Staff message sent to schedulers to call and arrange both. Discussion with patient to maintain proper hydration, but not over hydrate. Follow heart healthy diet, reduce sodium, and maintain a regular exercise routine. Discussion of at home monitoring of blood pressure and to bring to OV in 1 month.  Recommend an arm cuff, check every 2-3 days via digital arm cuff after 10 minutes of sitting quietly. Keep a BP log and bring to OV for review. No further questions at this time. MARSHALL,RN

## 2024-01-31 ENCOUNTER — LAB (OUTPATIENT)
Dept: LAB | Facility: CLINIC | Age: 44
End: 2024-01-31
Payer: COMMERCIAL

## 2024-01-31 DIAGNOSIS — E66.812 CLASS 2 OBESITY DUE TO EXCESS CALORIES WITHOUT SERIOUS COMORBIDITY WITH BODY MASS INDEX (BMI) OF 35.0 TO 35.9 IN ADULT: ICD-10-CM

## 2024-01-31 DIAGNOSIS — R00.2 PALPITATIONS: ICD-10-CM

## 2024-01-31 DIAGNOSIS — R06.09 EXERTIONAL DYSPNEA: ICD-10-CM

## 2024-01-31 DIAGNOSIS — I51.7 LEFT VENTRICULAR HYPERTROPHY: ICD-10-CM

## 2024-01-31 DIAGNOSIS — E66.09 CLASS 2 OBESITY DUE TO EXCESS CALORIES WITHOUT SERIOUS COMORBIDITY WITH BODY MASS INDEX (BMI) OF 35.0 TO 35.9 IN ADULT: ICD-10-CM

## 2024-01-31 PROCEDURE — 80048 BASIC METABOLIC PNL TOTAL CA: CPT

## 2024-01-31 PROCEDURE — 36415 COLL VENOUS BLD VENIPUNCTURE: CPT

## 2024-02-01 LAB
ANION GAP SERPL CALCULATED.3IONS-SCNC: 9 MMOL/L (ref 7–15)
BUN SERPL-MCNC: 14.8 MG/DL (ref 6–20)
CALCIUM SERPL-MCNC: 9.8 MG/DL (ref 8.6–10)
CHLORIDE SERPL-SCNC: 101 MMOL/L (ref 98–107)
CREAT SERPL-MCNC: 1.06 MG/DL (ref 0.67–1.17)
DEPRECATED HCO3 PLAS-SCNC: 28 MMOL/L (ref 22–29)
EGFRCR SERPLBLD CKD-EPI 2021: 89 ML/MIN/1.73M2
GLUCOSE SERPL-MCNC: 72 MG/DL (ref 70–99)
POTASSIUM SERPL-SCNC: 4.2 MMOL/L (ref 3.4–5.3)
SODIUM SERPL-SCNC: 138 MMOL/L (ref 135–145)

## 2024-02-06 ASSESSMENT — ANXIETY QUESTIONNAIRES
6. BECOMING EASILY ANNOYED OR IRRITABLE: MORE THAN HALF THE DAYS
4. TROUBLE RELAXING: MORE THAN HALF THE DAYS
3. WORRYING TOO MUCH ABOUT DIFFERENT THINGS: MORE THAN HALF THE DAYS
2. NOT BEING ABLE TO STOP OR CONTROL WORRYING: MORE THAN HALF THE DAYS
7. FEELING AFRAID AS IF SOMETHING AWFUL MIGHT HAPPEN: SEVERAL DAYS
GAD7 TOTAL SCORE: 12
7. FEELING AFRAID AS IF SOMETHING AWFUL MIGHT HAPPEN: SEVERAL DAYS
8. IF YOU CHECKED OFF ANY PROBLEMS, HOW DIFFICULT HAVE THESE MADE IT FOR YOU TO DO YOUR WORK, TAKE CARE OF THINGS AT HOME, OR GET ALONG WITH OTHER PEOPLE?: VERY DIFFICULT
1. FEELING NERVOUS, ANXIOUS, OR ON EDGE: MORE THAN HALF THE DAYS
5. BEING SO RESTLESS THAT IT IS HARD TO SIT STILL: SEVERAL DAYS
IF YOU CHECKED OFF ANY PROBLEMS ON THIS QUESTIONNAIRE, HOW DIFFICULT HAVE THESE PROBLEMS MADE IT FOR YOU TO DO YOUR WORK, TAKE CARE OF THINGS AT HOME, OR GET ALONG WITH OTHER PEOPLE: VERY DIFFICULT
GAD7 TOTAL SCORE: 12

## 2024-02-12 ASSESSMENT — PATIENT HEALTH QUESTIONNAIRE - PHQ9
SUM OF ALL RESPONSES TO PHQ QUESTIONS 1-9: 13
SUM OF ALL RESPONSES TO PHQ QUESTIONS 1-9: 13
10. IF YOU CHECKED OFF ANY PROBLEMS, HOW DIFFICULT HAVE THESE PROBLEMS MADE IT FOR YOU TO DO YOUR WORK, TAKE CARE OF THINGS AT HOME, OR GET ALONG WITH OTHER PEOPLE: VERY DIFFICULT

## 2024-02-13 ENCOUNTER — OFFICE VISIT (OUTPATIENT)
Dept: INTERNAL MEDICINE | Facility: CLINIC | Age: 44
End: 2024-02-13
Payer: COMMERCIAL

## 2024-02-13 VITALS
OXYGEN SATURATION: 98 % | WEIGHT: 286 LBS | TEMPERATURE: 98.4 F | SYSTOLIC BLOOD PRESSURE: 138 MMHG | HEART RATE: 60 BPM | HEIGHT: 76 IN | RESPIRATION RATE: 18 BRPM | BODY MASS INDEX: 34.83 KG/M2 | DIASTOLIC BLOOD PRESSURE: 88 MMHG

## 2024-02-13 DIAGNOSIS — F41.9 ANXIETY AND DEPRESSION: ICD-10-CM

## 2024-02-13 DIAGNOSIS — Z11.59 NEED FOR HEPATITIS C SCREENING TEST: ICD-10-CM

## 2024-02-13 DIAGNOSIS — F41.8 PERFORMANCE ANXIETY: ICD-10-CM

## 2024-02-13 DIAGNOSIS — Z11.4 SCREENING FOR HIV (HUMAN IMMUNODEFICIENCY VIRUS): Primary | ICD-10-CM

## 2024-02-13 DIAGNOSIS — F32.A ANXIETY AND DEPRESSION: ICD-10-CM

## 2024-02-13 PROCEDURE — 91320 SARSCV2 VAC 30MCG TRS-SUC IM: CPT | Performed by: NURSE PRACTITIONER

## 2024-02-13 PROCEDURE — 99204 OFFICE O/P NEW MOD 45 MIN: CPT | Mod: 25 | Performed by: NURSE PRACTITIONER

## 2024-02-13 PROCEDURE — 90480 ADMN SARSCOV2 VAC 1/ONLY CMP: CPT | Performed by: NURSE PRACTITIONER

## 2024-02-13 PROCEDURE — 96127 BRIEF EMOTIONAL/BEHAV ASSMT: CPT | Performed by: NURSE PRACTITIONER

## 2024-02-13 RX ORDER — DULOXETIN HYDROCHLORIDE 30 MG/1
30 CAPSULE, DELAYED RELEASE ORAL DAILY
Qty: 90 CAPSULE | Refills: 0 | Status: SHIPPED | OUTPATIENT
Start: 2024-02-13 | End: 2024-05-03

## 2024-02-13 RX ORDER — PROPRANOLOL HYDROCHLORIDE 10 MG/1
TABLET ORAL
Qty: 30 TABLET | Refills: 0 | Status: SHIPPED | OUTPATIENT
Start: 2024-02-13 | End: 2024-04-10

## 2024-02-13 ASSESSMENT — ENCOUNTER SYMPTOMS: NERVOUS/ANXIOUS: 1

## 2024-02-13 NOTE — PROGRESS NOTES
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  In anticipation of these situations, he will develop shaking and trembling with acute anxiety.  We talked about trying propranolol for these instances.    He will have a discussion with his cardiology PA about possibly substituting his metoprolol for the propranolol.    Of note, he has not needed the metoprolol for palpitations since his cardioversion in the ED    - propranolol (INDERAL) 10 MG tablet; Take 1-2 tablets 30-60 minutes prior to anxiety provoking event    Anxiety and depression  He does have chronic anxiety and depression symptoms on a daily basis.  We will start low-dose Cymbalta and follow-up in 1 month for recheck    - DULoxetine (CYMBALTA) 30 MG capsule; Take 1 capsule (30 mg) by mouth daily    Patient Instructions   Start Cymbalta (duloxetine) 30 mg capsule daily.    Follow-up virtually in 1 month to recheck anxiety and depression symptoms.    Propranolol 10 mg tablet: Take 1 to 2 tablets 30 to 60 minutes prior to anxiety provoking event.    Check with cardiology about substituting the propranolol for the metoprolol for palpitations.      Depression Screening Follow Up        2/12/2024     9:52 AM   PHQ   PHQ-9 Total Score 13   Q9: Thoughts of better off dead/self-harm past 2 weeks Not at all   Subjective   Krystian is a 43 year old, presenting for the following health issues:  Anxiety (Increased anxiety, when he gets nervous he shakes )      2/13/2024     7:55 AM   Additional Questions   Roomed by Jyoti DORADO     Anxiety    History of Present Illness       Mental Health Follow-up:  Patient presents to follow-up on Depression & Anxiety.Patient's depression since last visit has been:  No change  The patient is having other symptoms associated with depression.  Patient's anxiety since last visit has been:  No change  The patient is having other symptoms associated with anxiety.  Any  "significant life events: No  Patient is feeling anxious or having panic attacks.  Patient has concerns about alcohol or drug use.    He eats 2-3 servings of fruits and vegetables daily.He consumes 0 sweetened beverage(s) daily.He exercises with enough effort to increase his heart rate 30 to 60 minutes per day.  He exercises with enough effort to increase his heart rate 4 days per week.   He is taking medications regularly.       Patient is here for follow-up on his depression and anxiety symptoms.  He denies any suicidal ideation      Objective    /88 (BP Location: Right arm, Patient Position: Sitting, Cuff Size: Adult Regular)   Pulse 60   Temp 98.4  F (36.9  C)   Resp 18   Ht 1.93 m (6' 4\")   Wt 129.7 kg (286 lb)   SpO2 98%   BMI 34.81 kg/m    Body mass index is 34.81 kg/m .  Physical Exam   Patient is healthy appearing and in no acute distress        Signed Electronically by: Ashish Grace CNP    "

## 2024-02-13 NOTE — PATIENT INSTRUCTIONS
Start Cymbalta (duloxetine) 30 mg capsule daily.    Follow-up virtually in 1 month to recheck anxiety and depression symptoms.    Propranolol 10 mg tablet: Take 1 to 2 tablets 30 to 60 minutes prior to anxiety provoking event.    Check with cardiology about substituting the propranolol for the metoprolol for palpitations.

## 2024-02-23 ENCOUNTER — OFFICE VISIT (OUTPATIENT)
Dept: CARDIOLOGY | Facility: CLINIC | Age: 44
End: 2024-02-23
Payer: COMMERCIAL

## 2024-02-23 VITALS
RESPIRATION RATE: 16 BRPM | OXYGEN SATURATION: 97 % | SYSTOLIC BLOOD PRESSURE: 130 MMHG | WEIGHT: 276.5 LBS | HEART RATE: 62 BPM | BODY MASS INDEX: 33.66 KG/M2 | DIASTOLIC BLOOD PRESSURE: 78 MMHG

## 2024-02-23 DIAGNOSIS — I48.4 ATYPICAL ATRIAL FLUTTER (H): Primary | ICD-10-CM

## 2024-02-23 DIAGNOSIS — I51.7 LEFT VENTRICULAR HYPERTROPHY: ICD-10-CM

## 2024-02-23 DIAGNOSIS — I77.810 ASCENDING AORTA DILATATION (H): ICD-10-CM

## 2024-02-23 DIAGNOSIS — I10 BENIGN ESSENTIAL HYPERTENSION: ICD-10-CM

## 2024-02-23 PROCEDURE — 99214 OFFICE O/P EST MOD 30 MIN: CPT

## 2024-02-23 RX ORDER — METOPROLOL TARTRATE 25 MG/1
25 TABLET, FILM COATED ORAL 2 TIMES DAILY PRN
Start: 2024-02-23

## 2024-02-23 NOTE — PROGRESS NOTES
HEART CARE ENCOUNTER CONSULTATON NOTE      [facility]  [phone]      Assessment/Recommendations   Assessment:   Atrial flutter: not on anticoagulation with [id number] of 0, no known recurrence and has not needed metoprolol tartrate (25 mg) following cardioversion 12/23/2023  Ascending aortic dilation/LVH: 4.1 cm on echo with BP now well-controlled  HTN: Controlled on losartan 25 mg, hydrochlorothiazide 12.5 mg - BMP WNL  Situational anxiety: as needed propranolol 10 mg as prophylactic     Plan:   We discussed use of metoprolol vs propranolol for atrial flutter recurrence.  Optimal heart rate control with metoprolol versus propranolol.  Should not take metoprolol and propranolol at the same time.  Discussed reporting recurrent episodes, with heart rates/symptoms not responsive to medication should return to ED  Blood pressure well-controlled on losartan and HCTZ, repeat echocardiogram for aortic dilation monitoring in 1 year      Follow up in 1 year or sooner as needed      History of Present Illness/Subjective    HPI: Krystian Ortega is a 43 year old male with PMHx of atrial flutter, ascending aortic dilation, HTN, anxiety presents for follow up.  Seen in ED 12/23/2023 with shortness of breath and palpitations, finding of atrial flutter with RVR and underwent successful cardioversion.  Follow-up with Dr. Malagon and prescribed metoprolol as needed, not on anticoagulation with [id number] of 0.  Echocardiogram showed LVEF 60-65%, LVH and dilated left atrium. Started losartan and hydrochlorothiazide with stable BMP following.    Patient has stopped anticoagulation appropriately.  Has not needed metoprolol for recurrent tachycardia/shortness of breath.  No longer experiencing exertional dyspnea, denies chest pain or palpitations.  Overall denies side effects of antihypertensives although does feel a little bit lightheaded around midday.  Denies presyncope.  He recently saw primary care  physician who prescribed 10 mg of propranolol as needed for situational anxiety.  Patient becomes anxious with work presentations.  Physician did asked patient to consult cardiology regarding use of metoprolol versus propranolol for recurrent atrial flutter.  Patient plans to use propranolol as needed prophylactic prior to certain situations.    Is going on a trip to [place] within the next week and wonders if there is any precautions he should be taking      He denies shortness of breath, dyspnea on exertion, orthopnea, and lower extremity edema.      Echocardiogram 1/19/2024 Results:  Left ventricular size, wall motion and function are normal. The ejection  fraction is 60-65%.  Normal right ventricle size and systolic function.  The left atrium is mildly dilated.  Ascending Aorta dilatation is present(4.1cm).  No hemodynamically significant valvular abnormalities on 2D or color flow  imaging.     Physical Examination  Review of Systems   Vitals: /78 (BP Location: Right arm, Patient Position: Sitting, Cuff Size: Adult Large)   Pulse 62   Resp 16   Wt 125.4 kg (276 lb 8 oz)   SpO2 97%   BMI 33.66 kg/m    BMI= Body mass index is 33.66 kg/m .  Wt Readings from Last 3 Encounters:   02/23/24 125.4 kg (276 lb 8 oz)   02/13/24 129.7 kg (286 lb)   01/10/24 132.5 kg (292 lb)           ENT/Mouth: membranes moist, no oral lesions or bleeding gums.      EYES:  no scleral icterus, normal conjunctivae       Chest/Lungs:   lungs are clear to auscultation, no rales or wheezing, equal chest wall expansion    Cardiovascular:   Regular. Normal first and second heart sounds with rubs, or gallops; the carotid, radial and posterior tibial pulses are intact, absent edema bilaterally        Extremities: no cyanosis or clubbing   Skin: no xanthelasma, warm.    Neurologic: no tremors     Psychiatric: alert and oriented x3, calm        Please refer above for cardiac ROS details.        Medical History  Surgical History Family  History Social History   No past medical history on file.  Past Surgical History:   Procedure Laterality Date    ORCHIOPEXY       Family History   Problem Relation Age of Onset    No Known Problems Mother     No Known Problems Father     No Known Problems Sister     No Known Problems Brother         Social History     Socioeconomic History    Marital status:      Spouse name: Not on file    Number of children: Not on file    Years of education: Not on file    Highest education level: Not on file   Occupational History    Not on file   Tobacco Use    Smoking status: Never     Passive exposure: Never    Smokeless tobacco: Former   Vaping Use    Vaping Use: Never used   Substance and Sexual Activity    Alcohol use: Yes     Alcohol/week: 4.2 - 8.3 standard drinks of alcohol    Drug use: Yes     Comment: Drug use: nicotine tablets    Sexual activity: Not on file   Other Topics Concern    Not on file   Social History Narrative    Not on file     Social Determinants of Health     Financial Resource Strain: Not on file   Food Insecurity: Not on file   Transportation Needs: Not on file   Physical Activity: Not on file   Stress: Not on file   Social Connections: Not on file   Interpersonal Safety: Low Risk  (2/13/2024)    Interpersonal Safety     Do you feel physically and emotionally safe where you currently live?: Yes     Within the past 12 months, have you been hit, slapped, kicked or otherwise physically hurt by someone?: No     Within the past 12 months, have you been humiliated or emotionally abused in other ways by your partner or ex-partner?: No   Housing Stability: Not on file           Medications  Allergies   Current Outpatient Medications   Medication Sig Dispense Refill    DULoxetine (CYMBALTA) 30 MG capsule Take 1 capsule (30 mg) by mouth daily 90 capsule 0    hydrochlorothiazide (HYDRODIURIL) 12.5 MG tablet Take 1 tablet (12.5 mg) by mouth daily 30 tablet 11    losartan (COZAAR) 25 MG tablet Take 1  "tablet (25 mg) by mouth daily 30 tablet 11    metoprolol tartrate (LOPRESSOR) 25 MG tablet Take 1 tablet (25 mg) by mouth 2 times daily as needed (For increase heart rates.) (Patient not taking: Reported on 2/23/2024) 30 tablet 1    propranolol (INDERAL) 10 MG tablet Take 1-2 tablets 30-60 minutes prior to anxiety provoking event (Patient not taking: Reported on 2/23/2024) 30 tablet 0     No Known Allergies       Lab Results    Chemistry/lipid CBC Cardiac Enzymes/BNP/TSH/INR   No results for input(s): \"CHOL\", \"HDL\", \"LDL\", \"TRIG\", \"CHOLHDLRATIO\" in the last 57434 hours.  No results for input(s): \"LDL\" in the last 90410 hours.  Recent Labs   Lab Test 01/31/24  0829      POTASSIUM 4.2   CHLORIDE 101   CO2 28   GLC 72   BUN 14.8   CR 1.06   GFRESTIMATED 89   ESDRAS 9.8     Recent Labs   Lab Test 01/31/24  0829 12/23/23  1534   CR 1.06 1.10     No results for input(s): \"A1C\" in the last 62663 hours.       Recent Labs   Lab Test 12/23/23  1534   WBC 10.0   HGB 17.7   HCT 49.9   MCV 90        Recent Labs   Lab Test 12/23/23  1534   HGB 17.7    No results for input(s): \"TROPONINI\" in the last 77068 hours.  No results for input(s): \"BNP\", \"NTBNPI\", \"NTBNP\" in the last 12154 hours.  Recent Labs   Lab Test 12/23/23  1534   TSH 2.09     No results for input(s): \"INR\" in the last 53218 hours.       This note has been dictated using voice recognition software. Any grammatical, typographical, or context distortions are unintentional and inherent to the software    Rupali Rollins PA-C                                       "

## 2024-02-23 NOTE — PATIENT INSTRUCTIONS
It was a pleasure taking part in your care today:    - Continue propranolol as needed  - Report recurrent atrial flutter and use of propranolol   - Follow up in 1 year    Please call the [facility] clinic with any questions or concerns at [phone].     Rupali Rollins PA-C

## 2024-02-23 NOTE — LETTER
2/23/2024    Barney Cedeno MD  [address]  [facility] [address]    RE: Krystian Ortega       Dear Colleague,     I had the pleasure of seeing Krystian Ortega in the [facility] [place] [facility].    HEART CARE ENCOUNTER CONSULTATON NOTE      [facility]  [phone]      Assessment/Recommendations   Assessment:   Atrial flutter: not on anticoagulation with [id number] of 0, no known recurrence and has not needed metoprolol tartrate (25 mg) following cardioversion 12/23/2023  Ascending aortic dilation/LVH: 4.1 cm on echo with BP now well-controlled  HTN: Controlled on losartan 25 mg, hydrochlorothiazide 12.5 mg - BMP WNL  Situational anxiety: as needed propranolol 10 mg as prophylactic     Plan:   We discussed use of metoprolol vs propranolol for atrial flutter recurrence.  Optimal heart rate control with metoprolol versus propranolol.  Should not take metoprolol and propranolol at the same time.  Discussed reporting recurrent episodes, with heart rates/symptoms not responsive to medication should return to ED  Blood pressure well-controlled on losartan and HCTZ, repeat echocardiogram for aortic dilation monitoring in 1 year      Follow up in 1 year or sooner as needed      History of Present Illness/Subjective    HPI: Krystian Ortega is a 43 year old male with PMHx of atrial flutter, ascending aortic dilation, HTN, anxiety presents for follow up.  Seen in ED 12/23/2023 with shortness of breath and palpitations, finding of atrial flutter with RVR and underwent successful cardioversion.  Follow-up with Dr. Malagon and prescribed metoprolol as needed, not on anticoagulation with [id number] of 0.  Echocardiogram showed LVEF 60-65%, LVH and dilated left atrium. Started losartan and hydrochlorothiazide with stable BMP following.    Patient has stopped anticoagulation appropriately.  Has not needed metoprolol for recurrent tachycardia/shortness of breath.  No longer experiencing exertional  dyspnea, denies chest pain or palpitations.  Overall denies side effects of antihypertensives although does feel a little bit lightheaded around midday.  Denies presyncope.  He recently saw primary care physician who prescribed 10 mg of propranolol as needed for situational anxiety.  Patient becomes anxious with work presentations.  Physician did asked patient to consult cardiology regarding use of metoprolol versus propranolol for recurrent atrial flutter.  Patient plans to use propranolol as needed prophylactic prior to certain situations.    Is going on a trip to [place] within the next week and wonders if there is any precautions he should be taking      He denies shortness of breath, dyspnea on exertion, orthopnea, and lower extremity edema.      Echocardiogram 1/19/2024 Results:  Left ventricular size, wall motion and function are normal. The ejection  fraction is 60-65%.  Normal right ventricle size and systolic function.  The left atrium is mildly dilated.  Ascending Aorta dilatation is present(4.1cm).  No hemodynamically significant valvular abnormalities on 2D or color flow  imaging.     Physical Examination  Review of Systems   Vitals: /78 (BP Location: Right arm, Patient Position: Sitting, Cuff Size: Adult Large)   Pulse 62   Resp 16   Wt 125.4 kg (276 lb 8 oz)   SpO2 97%   BMI 33.66 kg/m    BMI= Body mass index is 33.66 kg/m .  Wt Readings from Last 3 Encounters:   02/23/24 125.4 kg (276 lb 8 oz)   02/13/24 129.7 kg (286 lb)   01/10/24 132.5 kg (292 lb)           ENT/Mouth: membranes moist, no oral lesions or bleeding gums.      EYES:  no scleral icterus, normal conjunctivae       Chest/Lungs:   lungs are clear to auscultation, no rales or wheezing, equal chest wall expansion    Cardiovascular:   Regular. Normal first and second heart sounds with rubs, or gallops; the carotid, radial and posterior tibial pulses are intact, absent edema bilaterally        Extremities: no cyanosis or clubbing    Skin: no xanthelasma, warm.    Neurologic: no tremors     Psychiatric: alert and oriented x3, calm        Please refer above for cardiac ROS details.        Medical History  Surgical History Family History Social History   No past medical history on file.  Past Surgical History:   Procedure Laterality Date    ORCHIOPEXY       Family History   Problem Relation Age of Onset    No Known Problems Mother     No Known Problems Father     No Known Problems Sister     No Known Problems Brother         Social History     Socioeconomic History    Marital status:      Spouse name: Not on file    Number of children: Not on file    Years of education: Not on file    Highest education level: Not on file   Occupational History    Not on file   Tobacco Use    Smoking status: Never     Passive exposure: Never    Smokeless tobacco: Former   Vaping Use    Vaping Use: Never used   Substance and Sexual Activity    Alcohol use: Yes     Alcohol/week: 4.2 - 8.3 standard drinks of alcohol    Drug use: Yes     Comment: Drug use: nicotine tablets    Sexual activity: Not on file   Other Topics Concern    Not on file   Social History Narrative    Not on file     Social Determinants of Health     Financial Resource Strain: Not on file   Food Insecurity: Not on file   Transportation Needs: Not on file   Physical Activity: Not on file   Stress: Not on file   Social Connections: Not on file   Interpersonal Safety: Low Risk  (2/13/2024)    Interpersonal Safety     Do you feel physically and emotionally safe where you currently live?: Yes     Within the past 12 months, have you been hit, slapped, kicked or otherwise physically hurt by someone?: No     Within the past 12 months, have you been humiliated or emotionally abused in other ways by your partner or ex-partner?: No   Housing Stability: Not on file           Medications  Allergies   Current Outpatient Medications   Medication Sig Dispense Refill    DULoxetine (CYMBALTA) 30 MG capsule  "Take 1 capsule (30 mg) by mouth daily 90 capsule 0    hydrochlorothiazide (HYDRODIURIL) 12.5 MG tablet Take 1 tablet (12.5 mg) by mouth daily 30 tablet 11    losartan (COZAAR) 25 MG tablet Take 1 tablet (25 mg) by mouth daily 30 tablet 11    metoprolol tartrate (LOPRESSOR) 25 MG tablet Take 1 tablet (25 mg) by mouth 2 times daily as needed (For increase heart rates.) (Patient not taking: Reported on 2/23/2024) 30 tablet 1    propranolol (INDERAL) 10 MG tablet Take 1-2 tablets 30-60 minutes prior to anxiety provoking event (Patient not taking: Reported on 2/23/2024) 30 tablet 0     No Known Allergies       Lab Results    Chemistry/lipid CBC Cardiac Enzymes/BNP/TSH/INR   No results for input(s): \"CHOL\", \"HDL\", \"LDL\", \"TRIG\", \"CHOLHDLRATIO\" in the last 49137 hours.  No results for input(s): \"LDL\" in the last 76478 hours.  Recent Labs   Lab Test 01/31/24  0829      POTASSIUM 4.2   CHLORIDE 101   CO2 28   GLC 72   BUN 14.8   CR 1.06   GFRESTIMATED 89   ESDARS 9.8     Recent Labs   Lab Test 01/31/24  0829 12/23/23  1534   CR 1.06 1.10     No results for input(s): \"A1C\" in the last 12667 hours.       Recent Labs   Lab Test 12/23/23  1534   WBC 10.0   HGB 17.7   HCT 49.9   MCV 90        Recent Labs   Lab Test 12/23/23  1534   HGB 17.7    No results for input(s): \"TROPONINI\" in the last 63592 hours.  No results for input(s): \"BNP\", \"NTBNPI\", \"NTBNP\" in the last 84173 hours.  Recent Labs   Lab Test 12/23/23  1534   TSH 2.09     No results for input(s): \"INR\" in the last 71048 hours.       This note has been dictated using voice recognition software. Any grammatical, typographical, or context distortions are unintentional and inherent to the software    Rupali Rollins PA-C    Thank you for allowing me to participate in the care of your patient.    Sincerely,   Rupali Chakraborty PA-C   [facility]  cc:   Arnulfo Malagon DO  [address] [place] [address] [place] [address]

## 2024-03-03 ENCOUNTER — HEALTH MAINTENANCE LETTER (OUTPATIENT)
Age: 44
End: 2024-03-03

## 2024-03-12 ASSESSMENT — ANXIETY QUESTIONNAIRES
8. IF YOU CHECKED OFF ANY PROBLEMS, HOW DIFFICULT HAVE THESE MADE IT FOR YOU TO DO YOUR WORK, TAKE CARE OF THINGS AT HOME, OR GET ALONG WITH OTHER PEOPLE?: SOMEWHAT DIFFICULT
6. BECOMING EASILY ANNOYED OR IRRITABLE: SEVERAL DAYS
7. FEELING AFRAID AS IF SOMETHING AWFUL MIGHT HAPPEN: MORE THAN HALF THE DAYS
3. WORRYING TOO MUCH ABOUT DIFFERENT THINGS: MORE THAN HALF THE DAYS
1. FEELING NERVOUS, ANXIOUS, OR ON EDGE: MORE THAN HALF THE DAYS
2. NOT BEING ABLE TO STOP OR CONTROL WORRYING: MORE THAN HALF THE DAYS
GAD7 TOTAL SCORE: 13
5. BEING SO RESTLESS THAT IT IS HARD TO SIT STILL: MORE THAN HALF THE DAYS
7. FEELING AFRAID AS IF SOMETHING AWFUL MIGHT HAPPEN: MORE THAN HALF THE DAYS
4. TROUBLE RELAXING: MORE THAN HALF THE DAYS
IF YOU CHECKED OFF ANY PROBLEMS ON THIS QUESTIONNAIRE, HOW DIFFICULT HAVE THESE PROBLEMS MADE IT FOR YOU TO DO YOUR WORK, TAKE CARE OF THINGS AT HOME, OR GET ALONG WITH OTHER PEOPLE: SOMEWHAT DIFFICULT
GAD7 TOTAL SCORE: 13

## 2024-03-13 ENCOUNTER — VIRTUAL VISIT (OUTPATIENT)
Dept: INTERNAL MEDICINE | Facility: CLINIC | Age: 44
End: 2024-03-13
Payer: COMMERCIAL

## 2024-03-13 DIAGNOSIS — F32.A ANXIETY AND DEPRESSION: Primary | ICD-10-CM

## 2024-03-13 DIAGNOSIS — F41.9 ANXIETY AND DEPRESSION: Primary | ICD-10-CM

## 2024-03-13 PROCEDURE — 99213 OFFICE O/P EST LOW 20 MIN: CPT | Mod: 95 | Performed by: NURSE PRACTITIONER

## 2024-03-13 RX ORDER — BUSPIRONE HYDROCHLORIDE 10 MG/1
10 TABLET ORAL 2 TIMES DAILY
Qty: 180 TABLET | Refills: 1 | Status: SHIPPED | OUTPATIENT
Start: 2024-03-13 | End: 2024-09-30

## 2024-03-13 NOTE — PROGRESS NOTES
"Krystian is a 43 year old who is being evaluated via a billable video visit.    How would you like to obtain your AVS? MyChart  If the video visit is dropped, the invitation should be resent by: Text to cell phone: [phone]  Will anyone else be joining your video visit? No      Assessment & Plan     Anxiety and depression  His PHQ-9 and SHINE-7 have not changed much over the last month although he does report subjective feelings of feeling a bit better as far as his anxiety and depression are concerned.    He suspects that there are some psychosocial stressors that are factoring into that.  Work has been a bit stressful.    It sounds like he is particularly anxious in the morning and in the evening.  I would like to start him on BuSpar 10 mg twice daily to account for these episodes.    Continue on Cymbalta 30 mg daily.    Continue on propranolol as needed for situational anxiety as it pertains to his work presentations, etc.    He will follow-up with me again virtually in 1 month  - busPIRone (BUSPAR) 10 MG tablet; Take 1 tablet (10 mg) by mouth 2 times daily            BMI  Estimated body mass index is 33.66 kg/m  as calculated from the following:    Height as of 2/13/24: 1.93 m (6' 4\").    Weight as of 2/23/24: 125.4 kg (276 lb 8 oz).             Subjective   Krystian is a 43 year old, presenting for the following health issues:  Follow Up (Anxiety/depression )    History of Present Illness       Mental Health Follow-up:  Patient presents to follow-up on Depression & Anxiety.Patient's depression since last visit has been:  Medium  The patient is not having other symptoms associated with depression.  Patient's anxiety since last visit has been:  Medium  The patient is not having other symptoms associated with anxiety.  Any significant life events: No  Patient is feeling anxious or having panic attacks.  Patient has no concerns about alcohol or drug use.    He eats 2-3 servings of fruits and vegetables daily.He " consumes 0 sweetened beverage(s) daily.He exercises with enough effort to increase his heart rate 30 to 60 minutes per day.  He exercises with enough effort to increase his heart rate 4 days per week.   He is taking medications regularly.                   Objective           Vitals:  No vitals were obtained today due to virtual visit.    Physical Exam   GENERAL: alert and no distress  EYES: Eyes grossly normal to inspection.  No discharge or erythema, or obvious scleral/conjunctival abnormalities.  RESP: No audible wheeze, cough, or visible cyanosis.    SKIN: Visible skin clear. No significant rash, abnormal pigmentation or lesions.  NEURO: Cranial nerves grossly intact.  Mentation and speech appropriate for age.  PSYCH: Appropriate affect, tone, and pace of words          Video-Visit Details    Type of service:  Video Visit   Originating Location (pt. Location): Home    Distant Location (provider location):  On-site  Platform used for Video Visit: Richard  Signed Electronically by: Ashish Grace CNP

## 2024-04-10 DIAGNOSIS — F41.8 PERFORMANCE ANXIETY: ICD-10-CM

## 2024-04-10 RX ORDER — PROPRANOLOL HYDROCHLORIDE 10 MG/1
TABLET ORAL
Qty: 30 TABLET | Refills: 0 | Status: SHIPPED | OUTPATIENT
Start: 2024-04-10 | End: 2024-05-03

## 2024-04-15 ENCOUNTER — TELEPHONE (OUTPATIENT)
Dept: CARDIOLOGY | Facility: CLINIC | Age: 44
End: 2024-04-15
Payer: COMMERCIAL

## 2024-04-15 DIAGNOSIS — I48.4 ATYPICAL ATRIAL FLUTTER (H): Primary | ICD-10-CM

## 2024-04-15 NOTE — TELEPHONE ENCOUNTER
PC to patient, no answer. LVM to call back in the morning to discuss. If having continued elevation in HR, and urgent questions can return call to the clinic and speak with on call provider. MARSHALL,Rn

## 2024-04-15 NOTE — TELEPHONE ENCOUNTER
[facility]    Phone Message    May a detailed message be left on voicemail: yes     Reason for Call: Symptoms or Concerns     If patient has red-flag symptoms, warm transfer to triage line    Current symptom or concern: HR of 145 and they want to know how long they should wait for the medicine to kick in before going to ER?    May call Krystian's phone also.      Action Taken: Other: Cardiology    Travel Screening: Not Applicable    Thank you!  [facility]

## 2024-04-16 NOTE — TELEPHONE ENCOUNTER
"Dr. Malagon, breakthrough event of Afib, possibly brought on by multiple factors. Utilized Lopressor 25 mg x 2 about 30-45 minutes a part= 50 mg. Encouraged to continue to monitor.   Ok to utilize Metoprolol in his manner? Any alt recs? CMM,Rn   ____________________________________________________________    PC with patient review of 24 hours prior to episode. Endorses caffeine use, a drink of alcohol or two on Sunday. Poor sleep evening prior. Awoke Monday, went into work. Stress at work. Denies current use of decongestants.   Yesterday started at work, around 1230, lasted for about 3 hours. [employer] did show HR's 140's, and \"possible Afib\". Denies SOB, sweating, chest pain or tightness, dizziness or lightheadedness. Symptom: felt heart racing, and anxiety. 3 hours after onset, with help of Metoprolol, normalized. HR reported yesterday 60-70's. No issues. Denies issues or symptoms today. Able to get a good cardio workout in today. Feels \"totally normal\" today.     Encouraged to monitor. Promote caffeine reduction, and limit alcohol use. Proper hydration. Adding an electrolyte drink into hydration if a vigorous workout with sweating, etc. Promote good sleep and stress reduction.   Review of in the future repeat episode stop, rest and hydrate. If racing heart persists or worsens to attempt the medication. If ongoing heart racing, symptoms without resolution report to ER. Will update MAT, and request recommendations. Will call once obtained to discuss. CMM,Rn   "

## 2024-04-16 NOTE — TELEPHONE ENCOUNTER
Incoming return call and VM left by patient. Reports, felt heart racing around 1230. Monitored, waited for approximately 1 hour. Didn't slow or resolve.  BPM. Took one dose of Metoprolol. Waited 45 minutes after dose taken. Took additional dose. 30-45 minutes after second dose, HR resolved. Pt reports that he was possibly in Afib. Heart racing lasted a total of approximately 3 hours. Wondering if ok to take Metoprolol twice with unresolved HR, if so, how long to wait after an initial dose, before taking second dose? MARSHALL,RN

## 2024-04-18 NOTE — TELEPHONE ENCOUNTER
PC with patient and review of provider response. Verbalized understanding. Agreeable to consult. Order placed. Direct transfer for  pt preference [facility].Review of med utilization. Call to report concerns, new symptoms or with questions. MARSHALL,RN

## 2024-05-03 DIAGNOSIS — F41.8 PERFORMANCE ANXIETY: ICD-10-CM

## 2024-05-03 DIAGNOSIS — F41.9 ANXIETY AND DEPRESSION: ICD-10-CM

## 2024-05-03 DIAGNOSIS — F32.A ANXIETY AND DEPRESSION: ICD-10-CM

## 2024-05-03 RX ORDER — PROPRANOLOL HYDROCHLORIDE 10 MG/1
TABLET ORAL
Qty: 30 TABLET | Refills: 0 | Status: SHIPPED | OUTPATIENT
Start: 2024-05-03 | End: 2024-05-29

## 2024-05-03 RX ORDER — DULOXETIN HYDROCHLORIDE 30 MG/1
30 CAPSULE, DELAYED RELEASE ORAL DAILY
Qty: 90 CAPSULE | Refills: 0 | Status: SHIPPED | OUTPATIENT
Start: 2024-05-03 | End: 2024-07-29

## 2024-05-03 NOTE — TELEPHONE ENCOUNTER
Prescription approved per [facility] Refill Protocol.  Phyllis Fonseca, RN  [facility] Triage Nurse

## 2024-05-23 ENCOUNTER — OFFICE VISIT (OUTPATIENT)
Dept: CARDIOLOGY | Facility: CLINIC | Age: 44
End: 2024-05-23
Payer: COMMERCIAL

## 2024-05-23 ENCOUNTER — ORDERS ONLY (AUTO-RELEASED) (OUTPATIENT)
Dept: CARDIOLOGY | Facility: CLINIC | Age: 44
End: 2024-05-23
Payer: COMMERCIAL

## 2024-05-23 VITALS
OXYGEN SATURATION: 95 % | HEART RATE: 64 BPM | WEIGHT: 279.2 LBS | BODY MASS INDEX: 33.99 KG/M2 | DIASTOLIC BLOOD PRESSURE: 76 MMHG | SYSTOLIC BLOOD PRESSURE: 122 MMHG | RESPIRATION RATE: 20 BRPM

## 2024-05-23 DIAGNOSIS — I48.0 PAROXYSMAL ATRIAL FIBRILLATION (H): Primary | ICD-10-CM

## 2024-05-23 DIAGNOSIS — I48.4 ATYPICAL ATRIAL FLUTTER (H): ICD-10-CM

## 2024-05-23 DIAGNOSIS — I48.3 TYPICAL ATRIAL FLUTTER (H): ICD-10-CM

## 2024-05-23 DIAGNOSIS — I48.0 PAROXYSMAL ATRIAL FIBRILLATION (H): ICD-10-CM

## 2024-05-23 PROCEDURE — 99204 OFFICE O/P NEW MOD 45 MIN: CPT | Performed by: INTERNAL MEDICINE

## 2024-05-23 PROCEDURE — G2211 COMPLEX E/M VISIT ADD ON: HCPCS | Performed by: INTERNAL MEDICINE

## 2024-05-23 NOTE — LETTER
2024    Ashish Grace, CNP  [address]  [facility] [address]    RE: Krystian Ortega       Dear Colleague,     I had the pleasure of seeing Krystian Ortega in the [facility].     [facility]  Cardiac Electrophysiology  [address]  [place] [address]   Office: [phone]  Fax: [phone]     Patient: Krystian Ortega   : 1980       CHIEF COMPLAINT/REASON FOR VISIT  Paroxysmal atrial fibrillation and atrial flutter,      Assessment/Recommendations     [id number]/Paroxysmal atrial fibrillation and atrial flutter, possible atrial tachycardia - symptomatic with palpitations  [id number] 1 (HTN, also note of ascending aortic dilation)  We reviewed atrial fibrillation physiology, managing stroke risk, cardioversion, antiarrhythmic drug therapy, and catheter ablation.  We discussed atrial fibrillation ablation procedures, anticipated success rates, the potential need for re-do ablation vs addition of anti-arrhythmic drugs, procedural risks (including groin bleeding, vascular injury, tamponade, phrenic or esophageal injury, stroke, pulmonary vein stenosis) and recovery expectations.  He would prefer ongoing expectant management at present to further understand AF/AFL and possible AT frequency and trajectory, with further consideration for escalation in therapy (class IA AAD PIP vs suppressive therapy vs ablation)  - Zio monitoring, 14d, mail out  - continue metoprolol 25mg twice daily as needed  - given his OCRQA1Ywuh 1, the long term risk-benefit balance of therapeutic anticoagulation is indeterminate.  We discussed these elements today - we elected to defer anticoagulation at present  - he will continue to monitoring atrial fibrillation/flutter/tachycardia including via use of Kardia for intermittent heart rhythm assessments, and will let us know should episodes progress in frequency, duration, or have less response to medical  therapy    Follow up: as above           History of Present Illness   Krystian Ortega is a 43 year old male with paroxysmal atrial fibrillation and atrial flutter, HTN, ascending aortic dilation, migraines referred by Dr. Malagon for consultation regarding atrial fibrillation.    Mr. Ortega's atrial fibrillation history is as summarized below:  Symptoms: palpitations  Symptom onset date: ~2020  Diagnosis date: ER visit 12/23/2023  Admissions/ER visits: 12/23/2023  Prior medical therapies: metoprolol PRN  Prior DCCVs: 12/23/2023 (ER)  Prior ablations: none  Percutaneous left atrial appendage occlusion: none    He notes 3 episodes of AF - most recently 4/2024.  He is very active.  He has been using a Kardia device.  He denies chest pain, syncope.    Kardia tracings (reviewed)  5/3/2024 - ST vs AT 117bpm  4/15/2024 (multiple) - AF, possible AFL, SR       Physical Examination  Review of Systems   VITALS: /76 (BP Location: Left arm, Patient Position: Sitting, Cuff Size: Adult Large)   Pulse 64   Resp 20   Wt 126.6 kg (279 lb 3.2 oz)   SpO2 95%   BMI 33.99 kg/m    Wt Readings from Last 3 Encounters:   02/23/24 125.4 kg (276 lb 8 oz)   02/13/24 129.7 kg (286 lb)   01/10/24 132.5 kg (292 lb)     CONSTITUTIONAL: well nourished, comfortable, no distress  EYES:  Conjunctivae pink, sclerae clear.    E/N/T:  Oral mucosa pink  RESPIRATORY:  Respiratory effort is normal  CARDIOVASCULAR:  normal S1 and S2  GASTROINTESTINAL:  Abdomen without masses or tenderness  EXTREMITIES:  No clubbing or cyanosis.    MUSCULOSKELETAL:  Overall grossly normal muscle strength  SKIN:  Overall, skin warm and dry, no lesions.  NEURO/PSYCH:  Oriented x 3 with normal affect.   Constitutional:  No weight loss or loss of appetite    Eyes:  No difficulty with vision, no double vision, no dry eyes  ENT:  No sore throat, difficulty swallowing; changes in hearing or tinnitus  Cardiovascular: As detailed above  Respiratory:  No cough  Musculoskeletal   No joint pain, muscle aches  Neurologic:  No syncope, lightheadedness, fainting spells   Hematologic: No easy bruising, excessive bleeding tendency   Gastrointestinal:  No jaundice, abdominal pain or abdominal bloating  Genitourinary: No changes in urinary habits, no trouble urinating    Psychiatric: No anxiety or depression      Medical History  Surgical History   No past medical history on file. Past Surgical History:   Procedure Laterality Date    ORCHIOPEXY           Family History Social History   Family History   Problem Relation Age of Onset    No Known Problems Mother     No Known Problems Father     No Known Problems Sister     No Known Problems Brother         Social History     Tobacco Use    Smoking status: Never     Passive exposure: Past    Smokeless tobacco: Former   Vaping Use    Vaping status: Never Used   Substance Use Topics    Alcohol use: Yes     Alcohol/week: 4.2 - 8.3 standard drinks of alcohol    Drug use: Yes     Comment: Drug use: nicotine tablets         Medications  Allergies     Current Outpatient Medications:     busPIRone (BUSPAR) 10 MG tablet, Take 1 tablet (10 mg) by mouth 2 times daily, Disp: 180 tablet, Rfl: 1    DULoxetine (CYMBALTA) 30 MG capsule, Take 1 capsule (30 mg) by mouth daily, Disp: 90 capsule, Rfl: 0    hydrochlorothiazide (HYDRODIURIL) 12.5 MG tablet, Take 1 tablet (12.5 mg) by mouth daily, Disp: 30 tablet, Rfl: 11    losartan (COZAAR) 25 MG tablet, Take 1 tablet (25 mg) by mouth daily, Disp: 30 tablet, Rfl: 11    metoprolol tartrate (LOPRESSOR) 25 MG tablet, Take 1 tablet (25 mg) by mouth 2 times daily as needed (For increase heart rates.), Disp: , Rfl:     propranolol (INDERAL) 10 MG tablet, Take 1-2 tablets 30-60 minutes prior to anxiety provoking event, Disp: 30 tablet, Rfl: 0   No Known Allergies       Lab Results    Chemistry CBC Cardiac Enzymes/BNP/TSH/INR   Recent Labs   Lab Test 01/31/24  0829      POTASSIUM 4.2   CHLORIDE 101   CO2 28   GLC 72   BUN  "14.8   CR 1.06   GFRESTIMATED 89   ESDRAS 9.8     Recent Labs   Lab Test 01/31/24  0829 12/23/23  1534   CR 1.06 1.10          Recent Labs   Lab Test 12/23/23  1534   WBC 10.0   HGB 17.7   HCT 49.9   MCV 90        Recent Labs   Lab Test 12/23/23  1534   HGB 17.7    No results for input(s): \"TROPONINI\" in the last 51803 hours.  No results for input(s): \"BNP\", \"NTBNPI\", \"NTBNP\" in the last 53090 hours.  Recent Labs   Lab Test 12/23/23  1534   TSH 2.09     No results for input(s): \"INR\" in the last 08381 hours.      Data Review    ECGs (tracings independently reviewed)  12/23/2024 - SR 93bpm, incomplete RBBB, LAFB QRS 92ms  12/23/2024 - AFL vs AF, 156bpm    1/19/2024 TTE  Left ventricular size, wall motion and function are normal. The ejection  fraction is 60-65%.  Normal right ventricle size and systolic function.  The left atrium is mildly dilated.  Ascending Aorta dilatation is present(4.1cm).  No hemodynamically significant valvular abnormalities on 2D or color flow  imaging.         Cc: Sanjay Brock DO, Kenneth Robert, CNP Amila Dilusha William, MD  5/23/2024  9:04 AM        Thank you for allowing me to participate in the care of your patient.      Sincerely,     Karen Doe MD     [facility]  cc:   Ashish Grace, CNP  [address]  [place] [address]      "

## 2024-05-23 NOTE — PATIENT INSTRUCTIONS
[facility]  Cardiac Electrophysiology  [address]  [place] [address]   Office: [phone]  Fax: [phone]     Thank you for seeing us in clinic today - it is a pleasure to be a part of your care team.  Below is a summary of our plan from today's visit.       You have atrial fibrillation and atrial flutter, and possible atrial tachycardia.  We reviewed atrial fibrillation, atrial flutter, and atrial tachycardia, treatment options (ablation vs antiarrhythmic drug therapy), and long term stroke risk prevention.    We will plan for the following:  - Zio monitoring, 14d, mail out  - continue metoprolol 25mg twice daily as needed  - continue to monitor atrial fibrillation/flutter/tachycardia including via use of Kardia for intermittent heart rhythm assessments, and let us know should episodes progress in frequency, duration, or have less response to medical therapy     Please do not hesitate to be in touch with our office at [phone] with any questions that may arise.       Thank you for trusting us with your care,    Karen Doe MD  Clinical Cardiac Electrophysiology  [facility]  [address]  [place] [address]   Office: [phone]  Fax: [phone]        ATRIAL FIBRILLATION: Patient Information    What is atrial fibrillation?  Atrial fibrillation (AF, A-fib) is a common heart rhythm problem (arrhythmia) occurring within the upper chambers of the heart (the atria).  In normal rhythm, the upper and lower chambers of the heart are electrically driven to contract in a coordinated sequence.  In atrial fibrillation, the atria lose their ability to contract because of rapid and chaotic electrical activity.  The lower chambers of the heart (the ventricles) continue to pump blood throughout the body, though with irregular and often faster rate due to the chaotic activity within the atria.        How do I know if I have  atrial fibrillation?   Some people may feel their heart beating faster, harder, or irregularly while in atrial fibrillation.  Others may be lightheaded, fatigued, feel weak or tired or become more short of breath especially with activities.  Some patients have no symptoms at all.  Atrial fibrillation may be found due to an irregular pulse or on an electrocardiogram (ECG). Atrial fibrillation can start and stop on its own, and episodes can last from seconds to several months.      How common is atrial fibrillation?   An estimated 3-6 million people in the [place] have atrial fibrillation.  Atrial fibrillation is a common heart rhythm problem for older persons, affecting as estimated 12-15% of people over the age of 65 years of age.    What causes atrial fibrillation?   Age is the most important risk factor for atrial fibrillation.  Atrial fibrillation is more common in people with other heart disease, high blood pressure, diabetes, obesity, sleep apnea and in people who regularly consume alcohol.  Surgery, lung disease, or thyroid problems can lead to atrial fibrillation.  Atrial fibrillation has multiple possible causes, and in most cases a single cause cannot be found.  Atrial fibrillation is a progressive condition, usually starting with at an early stage with short and infrequent episodes.  In later stages of disease, more frequent and longer lasting episodes of atrial fibrillation occur, ultimately culminating in episodes which do not spontaneously terminate.  Generally, more enlargement and scarring within the upper chambers of the heart is observed as atrial fibrillation progresses from early to late-stage disease.    How is atrial fibrillation diagnosed and evaluated?    Because of its start-stop nature, atrial fibrillation can be challenging to diagnose.  Atrial fibrillation is most commonly diagnosed via cardiac rhythm recordings - either an ECG or wearable cardiac rhythm monitor.  For patients with  pacemakers, defibrillators or implantable loop recorders, atrial fibrillation may be recorded via these devices.  Recently, commercially available devices (eg. Apple Watch, Glider.io device, certain [employer] devices, others) can allow patients to take 30 second cardiac rhythm recordings which may document atrial fibrillation.  Once atrial fibrillation is diagnosed, additional tests include blood tests and an echocardiogram.  The echocardiogram uses ultrasound to look at your heart to assess your cardiac function and evaluate for other heart disease.  Additional evaluation may include CT or MRI studies.    Is atrial fibrillation dangerous?   Atrial fibrillation is not usually a life-threatening arrhythmia.  The most serious consequences of atrial fibrillation including stroke and worsening of overall cardiac function.  While in atrial fibrillation, the upper cardiac chambers do not contract normally, resulting in slower blood flow and increased risk of clot formation.  If this blood clot becomes detached from the heart a stroke can occur.  Unfortunately, stroke can be the first sign of atrial fibrillation for some people.  With a stroke, you may notice abnormal sensation, weakness on one side of the body or face, changes in your vision or speech.  If you have any of these signs, you should contact EMS and be evaluated in an emergency room as soon as possible.      How is atrial fibrillation treated?     Several treatment options exist for suppressing atrial fibrillation - however, it is not an easily curable arrhythmia.  The first goal in managing atrial fibrillation is to minimize stroke risk.  The second goal is to improve symptoms associated with atrial fibrillation.  Finally, in patients with reduced cardiac function, maintaining normal rhythm can help improve cardiac function.      Blood thinners are used to reduce the risk of stroke in patients with high estimated stroke risk related to atrial fibrillation.  For  patients at higher risk of bleeding related to blood thinner use, implantable devices may be an option to reduce stroke risk without the need for long term blood thinner use.      Atrial fibrillation can be managed via two strategies: rate control and rhythm control.  In a rate control strategy, continued atrial fibrillation is accepted and medications (eg. beta-blockers or calcium channel blockers) are used to control the lower chamber rate.  In a rhythm control strategy, anti-arrhythmic medications or catheter ablation are used to maintain normal cardiac rhythm and slow disease progression by suppressing atrial fibrillation.  A procedure called a cardioversion, in which an electric shock is delivered through patches placed on the chest wall while under deep sedation, can be performed to temporarily restore normal cardiac rhythm, though does not address the chance of atrial fibrillation recurrence.  Treatments are more effective for earlier rather than later stage atrial fibrillation.  Lifestyle modifications (maintaining a healthy weight, aerobic exercise, diagnosing and treating sleep apnea, and minimizing alcohol intake) are important elements of atrial fibrillation rhythm control.     What is catheter ablation for atrial fibrillation?  Cardiac catheter ablation is a commonly performed, minimally invasive procedure performed by a cardiac electrophysiologist to treat many different cardiac rhythm abnormalities.  During catheter ablation, long, thin, flexible tubes are advanced into the heart via small sheaths inserted into the femoral veins and thermal energy (either heating or cooling) is applied within the heart to disrupt abnormal electrical activity.  Atrial fibrillation ablation is performed under general anesthesia, with procedures generally taking approximately 2-3 hours.  Patients are typically observed for 3-5 hours after the ablation, and in most cases can be discharged home the same day.  Atrial  fibrillation ablation is associated with better outcomes (mortality, cardiovascular hospitalizations, atrial arrhythmia recurrences) compared to antiarrhythmic drug therapy.  However, atrial fibrillation recurrences are not uncommon, and repeat catheter ablation may be offered.  Your electrophysiology team can review atrial fibrillation ablation, anticipated success rates, risks, and recovery expectations with you.    What are anti-arrhythmic medications?  Anti-arrhythmic medications are specialized drugs which alter cardiac electrical functioning to suppress arrhythmias.  There are several anti-arrhythmic medications available, each with its own success rate and side effects.  Some anti-arrhythmic medications are less effective though safer to use, others are more effective though have serious potential toxicities.  Atrial fibrillation recurrences are common and may require dose adjustment or change in antiarrhythmic therapy.  Your electrophysiology team will carefully consider which medication would be the best and safest for your particular case.      Can I live a normal life?    The goal of atrial fibrillation management is for patients to live normal lives without being limited by symptoms related to atrial fibrillation.    Are any additional educational resources available?  There are a number of excellent atrial fibrillation education resources available to you online.  A few options you may wish to review include:  hrsonline.org/guide-atrial-fibrillation  afibmatters.org  getsmartaboutafib.com  stopaf.com    What comes next?    Consider your management options and let us know how we can help in your decision process.  Please take medications as they have been prescribed.  You should also get any tests that may have been ordered for you.      When to Call Your Doctor or seek emergency care:  Call your doctor or seek emergency care if you have any significant changes with the  following:  Weakness  Dizziness  Fainting  Fatigue  Shortness of breath  Chest pain with increased activity  If you are concerned that your heart rate is too fast or too slow  Bleeding that does not stop in 10 minutes  Coughing or throwing up blood  Bloody diarrhea or bleeding hemorrhoids  Dark-colored urine or black stool  Allergic reactions:  Rash  Itching  Swelling  Trouble breathing or swallowing      Please call the [facility] at [phone] if you have concerns about your symptoms, your medicines, or your follow-up appointments.

## 2024-05-23 NOTE — PROGRESS NOTES
[facility]  Cardiac Electrophysiology  [address]  [place] [address]   Office: [phone]  Fax: [phone]     Patient: Krystian Ortega   : 1980       CHIEF COMPLAINT/REASON FOR VISIT  Paroxysmal atrial fibrillation and atrial flutter,      Assessment/Recommendations     [id number]/Paroxysmal atrial fibrillation and atrial flutter, possible atrial tachycardia - symptomatic with palpitations  [id number] 1 (HTN, also note of ascending aortic dilation)  We reviewed atrial fibrillation physiology, managing stroke risk, cardioversion, antiarrhythmic drug therapy, and catheter ablation.  We discussed atrial fibrillation ablation procedures, anticipated success rates, the potential need for re-do ablation vs addition of anti-arrhythmic drugs, procedural risks (including groin bleeding, vascular injury, tamponade, phrenic or esophageal injury, stroke, pulmonary vein stenosis) and recovery expectations.  He would prefer ongoing expectant management at present to further understand AF/AFL and possible AT frequency and trajectory, with further consideration for escalation in therapy (class IA AAD PIP vs suppressive therapy vs ablation)  - Zio monitoring, 14d, mail out  - continue metoprolol 25mg twice daily as needed  - given his [id number] 1, the long term risk-benefit balance of therapeutic anticoagulation is indeterminate.  We discussed these elements today - we elected to defer anticoagulation at present  - he will continue to monitoring atrial fibrillation/flutter/tachycardia including via use of Kardia for intermittent heart rhythm assessments, and will let us know should episodes progress in frequency, duration, or have less response to medical therapy    Follow up: as above           History of Present Illness   Krystian Ortega is a 43 year old male with paroxysmal atrial fibrillation and atrial flutter, HTN, ascending aortic dilation, migraines referred by   [facility] for consultation regarding atrial fibrillation.    Mr. Ortega's atrial fibrillation history is as summarized below:  Symptoms: palpitations  Symptom onset date: ~2020  Diagnosis date: ER visit 12/23/2023  Admissions/ER visits: 12/23/2023  Prior medical therapies: metoprolol PRN  Prior DCCVs: 12/23/2023 (ER)  Prior ablations: none  Percutaneous left atrial appendage occlusion: none    He notes 3 episodes of AF - most recently 4/2024.  He is very active.  He has been using a Kardia device.  He denies chest pain, syncope.    Kardia tracings (reviewed)  5/3/2024 - ST vs AT 117bpm  4/15/2024 (multiple) - AF, possible AFL, SR       Physical Examination  Review of Systems   VITALS: /76 (BP Location: Left arm, Patient Position: Sitting, Cuff Size: Adult Large)   Pulse 64   Resp 20   Wt 126.6 kg (279 lb 3.2 oz)   SpO2 95%   BMI 33.99 kg/m    Wt Readings from Last 3 Encounters:   02/23/24 125.4 kg (276 lb 8 oz)   02/13/24 129.7 kg (286 lb)   01/10/24 132.5 kg (292 lb)     CONSTITUTIONAL: well nourished, comfortable, no distress  EYES:  Conjunctivae pink, sclerae clear.    E/N/T:  Oral mucosa pink  RESPIRATORY:  Respiratory effort is normal  CARDIOVASCULAR:  normal S1 and S2  GASTROINTESTINAL:  Abdomen without masses or tenderness  EXTREMITIES:  No clubbing or cyanosis.    MUSCULOSKELETAL:  Overall grossly normal muscle strength  SKIN:  Overall, skin warm and dry, no lesions.  NEURO/PSYCH:  Oriented x 3 with normal affect.   Constitutional:  No weight loss or loss of appetite    Eyes:  No difficulty with vision, no double vision, no dry eyes  ENT:  No sore throat, difficulty swallowing; changes in hearing or tinnitus  Cardiovascular: As detailed above  Respiratory:  No cough  Musculoskeletal  No joint pain, muscle aches  Neurologic:  No syncope, lightheadedness, fainting spells   Hematologic: No easy bruising, excessive bleeding tendency   Gastrointestinal:  No jaundice, abdominal pain or abdominal  bloating  Genitourinary: No changes in urinary habits, no trouble urinating    Psychiatric: No anxiety or depression      Medical History  Surgical History   No past medical history on file. Past Surgical History:   Procedure Laterality Date    ORCHIOPEXY           Family History Social History   Family History   Problem Relation Age of Onset    No Known Problems Mother     No Known Problems Father     No Known Problems Sister     No Known Problems Brother         Social History     Tobacco Use    Smoking status: Never     Passive exposure: Past    Smokeless tobacco: Former   Vaping Use    Vaping status: Never Used   Substance Use Topics    Alcohol use: Yes     Alcohol/week: 4.2 - 8.3 standard drinks of alcohol    Drug use: Yes     Comment: Drug use: nicotine tablets         Medications  Allergies     Current Outpatient Medications:     busPIRone (BUSPAR) 10 MG tablet, Take 1 tablet (10 mg) by mouth 2 times daily, Disp: 180 tablet, Rfl: 1    DULoxetine (CYMBALTA) 30 MG capsule, Take 1 capsule (30 mg) by mouth daily, Disp: 90 capsule, Rfl: 0    hydrochlorothiazide (HYDRODIURIL) 12.5 MG tablet, Take 1 tablet (12.5 mg) by mouth daily, Disp: 30 tablet, Rfl: 11    losartan (COZAAR) 25 MG tablet, Take 1 tablet (25 mg) by mouth daily, Disp: 30 tablet, Rfl: 11    metoprolol tartrate (LOPRESSOR) 25 MG tablet, Take 1 tablet (25 mg) by mouth 2 times daily as needed (For increase heart rates.), Disp: , Rfl:     propranolol (INDERAL) 10 MG tablet, Take 1-2 tablets 30-60 minutes prior to anxiety provoking event, Disp: 30 tablet, Rfl: 0   No Known Allergies       Lab Results    Chemistry CBC Cardiac Enzymes/BNP/TSH/INR   Recent Labs   Lab Test 01/31/24  0829      POTASSIUM 4.2   CHLORIDE 101   CO2 28   GLC 72   BUN 14.8   CR 1.06   GFRESTIMATED 89   ESDRAS 9.8     Recent Labs   Lab Test 01/31/24  0829 12/23/23  1534   CR 1.06 1.10          Recent Labs   Lab Test 12/23/23  1534   WBC 10.0   HGB 17.7   HCT 49.9   MCV 90     "    Recent Labs   Lab Test 12/23/23  1534   HGB 17.7    No results for input(s): \"TROPONINI\" in the last 99018 hours.  No results for input(s): \"BNP\", \"NTBNPI\", \"NTBNP\" in the last 77967 hours.  Recent Labs   Lab Test 12/23/23  1534   TSH 2.09     No results for input(s): \"INR\" in the last 76519 hours.      Data Review    ECGs (tracings independently reviewed)  12/23/2024 - SR 93bpm, incomplete RBBB, LAFB QRS 92ms  12/23/2024 - AFL vs AF, 156bpm    1/19/2024 TTE  Left ventricular size, wall motion and function are normal. The ejection  fraction is 60-65%.  Normal right ventricle size and systolic function.  The left atrium is mildly dilated.  Ascending Aorta dilatation is present(4.1cm).  No hemodynamically significant valvular abnormalities on 2D or color flow  imaging.         Cc: Sanjay Brock DO, Kenneth Robert, KANA Doe MD  5/23/2024  9:04 AM      "

## 2024-05-29 DIAGNOSIS — F41.8 PERFORMANCE ANXIETY: ICD-10-CM

## 2024-05-29 RX ORDER — PROPRANOLOL HYDROCHLORIDE 10 MG/1
TABLET ORAL
Qty: 30 TABLET | Refills: 0 | Status: SHIPPED | OUTPATIENT
Start: 2024-05-29 | End: 2024-07-29

## 2024-07-01 PROCEDURE — 93244 EXT ECG>48HR<7D REV&INTERPJ: CPT | Performed by: INTERNAL MEDICINE

## 2024-07-26 DIAGNOSIS — F32.A ANXIETY AND DEPRESSION: ICD-10-CM

## 2024-07-26 DIAGNOSIS — F41.9 ANXIETY AND DEPRESSION: ICD-10-CM

## 2024-07-26 DIAGNOSIS — F41.8 PERFORMANCE ANXIETY: ICD-10-CM

## 2024-07-29 RX ORDER — PROPRANOLOL HYDROCHLORIDE 10 MG/1
TABLET ORAL
Qty: 30 TABLET | Refills: 0 | Status: SHIPPED | OUTPATIENT
Start: 2024-07-29 | End: 2024-08-21

## 2024-07-29 RX ORDER — DULOXETIN HYDROCHLORIDE 30 MG/1
30 CAPSULE, DELAYED RELEASE ORAL DAILY
Qty: 30 CAPSULE | Refills: 2 | Status: SHIPPED | OUTPATIENT
Start: 2024-07-29

## 2024-08-21 DIAGNOSIS — F41.8 PERFORMANCE ANXIETY: ICD-10-CM

## 2024-08-21 RX ORDER — PROPRANOLOL HYDROCHLORIDE 10 MG/1
TABLET ORAL
Qty: 30 TABLET | Refills: 0 | Status: SHIPPED | OUTPATIENT
Start: 2024-08-21 | End: 2024-10-07

## 2024-09-28 DIAGNOSIS — F32.A ANXIETY AND DEPRESSION: ICD-10-CM

## 2024-09-28 DIAGNOSIS — F41.9 ANXIETY AND DEPRESSION: ICD-10-CM

## 2024-09-30 RX ORDER — BUSPIRONE HYDROCHLORIDE 10 MG/1
10 TABLET ORAL 2 TIMES DAILY
Qty: 60 TABLET | Refills: 5 | Status: SHIPPED | OUTPATIENT
Start: 2024-09-30

## 2024-10-07 DIAGNOSIS — F41.8 PERFORMANCE ANXIETY: ICD-10-CM

## 2024-10-07 RX ORDER — PROPRANOLOL HYDROCHLORIDE 10 MG/1
TABLET ORAL
Qty: 30 TABLET | Refills: 0 | Status: SHIPPED | OUTPATIENT
Start: 2024-10-07 | End: 2024-11-11

## 2024-10-29 DIAGNOSIS — F32.A ANXIETY AND DEPRESSION: ICD-10-CM

## 2024-10-29 DIAGNOSIS — F41.9 ANXIETY AND DEPRESSION: ICD-10-CM

## 2024-10-30 RX ORDER — DULOXETIN HYDROCHLORIDE 30 MG/1
30 CAPSULE, DELAYED RELEASE ORAL DAILY
Qty: 30 CAPSULE | Refills: 2 | Status: SHIPPED | OUTPATIENT
Start: 2024-10-30

## 2024-11-10 DIAGNOSIS — F41.8 PERFORMANCE ANXIETY: ICD-10-CM

## 2024-11-11 RX ORDER — PROPRANOLOL HYDROCHLORIDE 10 MG/1
TABLET ORAL
Qty: 30 TABLET | Refills: 0 | Status: SHIPPED | OUTPATIENT
Start: 2024-11-11

## 2024-12-28 DIAGNOSIS — I51.7 LEFT VENTRICULAR HYPERTROPHY: ICD-10-CM

## 2024-12-28 DIAGNOSIS — E66.09 CLASS 2 OBESITY DUE TO EXCESS CALORIES WITHOUT SERIOUS COMORBIDITY WITH BODY MASS INDEX (BMI) OF 35.0 TO 35.9 IN ADULT: ICD-10-CM

## 2024-12-28 DIAGNOSIS — R06.09 EXERTIONAL DYSPNEA: ICD-10-CM

## 2024-12-28 DIAGNOSIS — R00.2 PALPITATIONS: ICD-10-CM

## 2024-12-28 DIAGNOSIS — E66.812 CLASS 2 OBESITY DUE TO EXCESS CALORIES WITHOUT SERIOUS COMORBIDITY WITH BODY MASS INDEX (BMI) OF 35.0 TO 35.9 IN ADULT: ICD-10-CM

## 2024-12-30 RX ORDER — HYDROCHLOROTHIAZIDE 12.5 MG/1
12.5 TABLET ORAL DAILY
Qty: 30 TABLET | Refills: 2 | Status: SHIPPED | OUTPATIENT
Start: 2024-12-30

## 2024-12-30 RX ORDER — LOSARTAN POTASSIUM 25 MG/1
25 TABLET ORAL DAILY
Qty: 30 TABLET | Refills: 2 | Status: SHIPPED | OUTPATIENT
Start: 2024-12-30

## 2024-12-30 NOTE — TELEPHONE ENCOUNTER
Pt last saw Mat 1/2024 and MH 2/2024 - advised to continue meds and follow-up in one year. Msg sent to schedulers to reach out to pt and arrange. Refill provided for 3 months. aj

## 2025-01-07 DIAGNOSIS — F41.8 PERFORMANCE ANXIETY: ICD-10-CM

## 2025-01-07 RX ORDER — PROPRANOLOL HYDROCHLORIDE 10 MG/1
TABLET ORAL
Qty: 180 TABLET | Refills: 1 | Status: SHIPPED | OUTPATIENT
Start: 2025-01-07

## 2025-01-22 DIAGNOSIS — R06.09 EXERTIONAL DYSPNEA: ICD-10-CM

## 2025-01-22 DIAGNOSIS — E66.812 CLASS 2 OBESITY DUE TO EXCESS CALORIES WITHOUT SERIOUS COMORBIDITY WITH BODY MASS INDEX (BMI) OF 35.0 TO 35.9 IN ADULT: ICD-10-CM

## 2025-01-22 DIAGNOSIS — R00.2 PALPITATIONS: ICD-10-CM

## 2025-01-22 DIAGNOSIS — F41.9 ANXIETY AND DEPRESSION: ICD-10-CM

## 2025-01-22 DIAGNOSIS — I51.7 LEFT VENTRICULAR HYPERTROPHY: ICD-10-CM

## 2025-01-22 DIAGNOSIS — F32.A ANXIETY AND DEPRESSION: ICD-10-CM

## 2025-01-22 DIAGNOSIS — E66.09 CLASS 2 OBESITY DUE TO EXCESS CALORIES WITHOUT SERIOUS COMORBIDITY WITH BODY MASS INDEX (BMI) OF 35.0 TO 35.9 IN ADULT: ICD-10-CM

## 2025-01-22 RX ORDER — BUSPIRONE HYDROCHLORIDE 10 MG/1
10 TABLET ORAL 2 TIMES DAILY
Qty: 180 TABLET | Refills: 2 | OUTPATIENT
Start: 2025-01-22

## 2025-01-22 RX ORDER — DULOXETIN HYDROCHLORIDE 30 MG/1
30 CAPSULE, DELAYED RELEASE ORAL DAILY
Qty: 90 CAPSULE | Refills: 1 | Status: SHIPPED | OUTPATIENT
Start: 2025-01-22

## 2025-01-23 RX ORDER — LOSARTAN POTASSIUM 25 MG/1
25 TABLET ORAL DAILY
Qty: 90 TABLET | Refills: 0 | Status: SHIPPED | OUTPATIENT
Start: 2025-01-23

## 2025-01-23 RX ORDER — HYDROCHLOROTHIAZIDE 12.5 MG/1
12.5 TABLET ORAL DAILY
Qty: 90 TABLET | Refills: 0 | Status: SHIPPED | OUTPATIENT
Start: 2025-01-23

## 2025-02-19 ASSESSMENT — ANXIETY QUESTIONNAIRES
GAD7 TOTAL SCORE: 9
2. NOT BEING ABLE TO STOP OR CONTROL WORRYING: MORE THAN HALF THE DAYS
7. FEELING AFRAID AS IF SOMETHING AWFUL MIGHT HAPPEN: NOT AT ALL
1. FEELING NERVOUS, ANXIOUS, OR ON EDGE: SEVERAL DAYS
3. WORRYING TOO MUCH ABOUT DIFFERENT THINGS: MORE THAN HALF THE DAYS
8. IF YOU CHECKED OFF ANY PROBLEMS, HOW DIFFICULT HAVE THESE MADE IT FOR YOU TO DO YOUR WORK, TAKE CARE OF THINGS AT HOME, OR GET ALONG WITH OTHER PEOPLE?: VERY DIFFICULT
GAD7 TOTAL SCORE: 9
4. TROUBLE RELAXING: MORE THAN HALF THE DAYS
IF YOU CHECKED OFF ANY PROBLEMS ON THIS QUESTIONNAIRE, HOW DIFFICULT HAVE THESE PROBLEMS MADE IT FOR YOU TO DO YOUR WORK, TAKE CARE OF THINGS AT HOME, OR GET ALONG WITH OTHER PEOPLE: VERY DIFFICULT
5. BEING SO RESTLESS THAT IT IS HARD TO SIT STILL: SEVERAL DAYS
6. BECOMING EASILY ANNOYED OR IRRITABLE: SEVERAL DAYS

## 2025-02-20 ENCOUNTER — VIRTUAL VISIT (OUTPATIENT)
Dept: INTERNAL MEDICINE | Facility: CLINIC | Age: 45
End: 2025-02-20
Payer: COMMERCIAL

## 2025-02-20 DIAGNOSIS — F41.9 ANXIETY AND DEPRESSION: ICD-10-CM

## 2025-02-20 DIAGNOSIS — Z12.11 SCREEN FOR COLON CANCER: Primary | ICD-10-CM

## 2025-02-20 DIAGNOSIS — F32.A ANXIETY AND DEPRESSION: ICD-10-CM

## 2025-02-20 DIAGNOSIS — F41.8 PERFORMANCE ANXIETY: ICD-10-CM

## 2025-02-20 RX ORDER — ALBUTEROL SULFATE 90 UG/1
2 INHALANT RESPIRATORY (INHALATION) PRN
COMMUNITY
Start: 2025-02-07

## 2025-02-20 RX ORDER — BUSPIRONE HYDROCHLORIDE 15 MG/1
15 TABLET ORAL 2 TIMES DAILY
Qty: 180 TABLET | Refills: 1 | Status: SHIPPED | OUTPATIENT
Start: 2025-02-20

## 2025-02-20 RX ORDER — PROPRANOLOL HYDROCHLORIDE 40 MG/1
TABLET ORAL
Qty: 30 TABLET | Refills: 1 | Status: SHIPPED | OUTPATIENT
Start: 2025-02-20

## 2025-02-20 RX ORDER — BENZONATATE 100 MG/1
100 CAPSULE ORAL 2 TIMES DAILY PRN
COMMUNITY
Start: 2025-02-07

## 2025-02-20 NOTE — PATIENT INSTRUCTIONS
Increase the buspirone to 15 mg twice daily.    Continue on duloxetine 30 mg daily.    Increase propranolol to 40 mg as needed for anxiety provoking event.    Please send me a [employer] message in 1 month and let me know how you are feeling

## 2025-02-20 NOTE — PROGRESS NOTES
Krystian is a 44 year old who is being evaluated via a billable video visit.    How would you like to obtain your AVS? MyChart  If the video visit is dropped, the invitation should be resent by: Text to cell phone: [phone]  Will anyone else be joining your video visit? No      Assessment & Plan     Anxiety and depression  His baseline anxiety and depression symptoms have been reasonably well-controlled.  However, his performance anxiety has continued to be a problem for him.  He has had some changes with his job lately, his dog recently passed away.    It sounds like he is not taking his buspirone twice daily.  Reiterated the importance of taking the buspirone twice daily.  We will increase the dose to 15 mg twice daily    - busPIRone (BUSPAR) 15 MG tablet; Take 1 tablet (15 mg) by mouth 2 times daily.    Performance anxiety  He tried increasing the dose of the propranolol to 20 mg on his own without much success.  Increase to 40 mg 30 to 60 minutes as needed for anxiety provoking event    - propranolol (INDERAL) 40 MG tablet; Take 1 tablets 30-60 minutes prior to anxiety provoking event    Screen for colon cancer  He does have some intermittent light bleeding from his rectum that we suspect is a hemorrhoid.  He is due for screening colonoscopy anyway  - Colonoscopy Screening  Referral; Future    Patient Instructions   Increase the buspirone to 15 mg twice daily.    Continue on duloxetine 30 mg daily.    Increase propranolol to 40 mg as needed for anxiety provoking event.    Please send me a Center[employer] message in 1 month and let me know how you are feeling              Subjective   Krystian is a 44 year old, presenting for the following health issues:  Recheck Medication (Med check - would like to get colonoscopy scheduled)      2/20/2025    10:32 AM   Additional Questions   Roomed by Jyoti DORADO     History of Present Illness       Mental Health Follow-up:  Patient presents to follow-up on Depression &  Anxiety.Patient's depression since last visit has been:  Medium  The patient is not having other symptoms associated with depression.  Patient's anxiety since last visit has been:  Bad  The patient is not having other symptoms associated with anxiety.  Any significant life events: No  Patient is feeling anxious or having panic attacks.  Patient has no concerns about alcohol or drug use.    He eats 2-3 servings of fruits and vegetables daily.He consumes 0 sweetened beverage(s) daily.He exercises with enough effort to increase his heart rate 30 to 60 minutes per day.  He exercises with enough effort to increase his heart rate 3 or less days per week.   He is taking medications regularly.                   Objective           Vitals:  No vitals were obtained today due to virtual visit.    Physical Exam   GENERAL: alert and no distress  EYES: Eyes grossly normal to inspection.  No discharge or erythema, or obvious scleral/conjunctival abnormalities.  RESP: No audible wheeze, cough, or visible cyanosis.    SKIN: Visible skin clear. No significant rash, abnormal pigmentation or lesions.  NEURO: Cranial nerves grossly intact.  Mentation and speech appropriate for age.  PSYCH: Appropriate affect, tone, and pace of words          Video-Visit Details    Type of service:  Video Visit   Originating Location (pt. Location): Home    Distant Location (provider location):  On-site  Platform used for Video Visit: Doxluca  Signed Electronically by: Ashish Grace CNP

## 2025-02-27 ENCOUNTER — TELEPHONE (OUTPATIENT)
Dept: GASTROENTEROLOGY | Facility: CLINIC | Age: 45
End: 2025-02-27
Payer: COMMERCIAL

## 2025-02-27 NOTE — TELEPHONE ENCOUNTER
"Patient requested [facility] and call transferred to [facility]    Endoscopy Scheduling Screen    Have you had any respiratory illness or flu-like symptoms in the last 10 days?  Yes (Schedule at least 10 days from symptom onset)    What is your communication preference for Instructions and/or Bowel Prep?   Titust    What insurance is in the chart?  Other:  [employer]    Ordering/Referring Provider: BOBBI JOHNSON   (If ordering provider performs procedure, schedule with ordering provider unless otherwise instructed. )    BMI: Estimated body mass index is 33.99 kg/m  as calculated from the following:    Height as of 2/13/24: 1.93 m (6' 4\").    Weight as of 5/23/24: 126.6 kg (279 lb 3.2 oz).     Sedation Ordered  moderate sedation.   If patient BMI > 50 do not schedule in [facility].    If patient BMI > 45 do not schedule at [facility].    Are you taking methadone or Suboxone?  NO, No RN review required.    Have you been diagnosed and are being treated for severe PTSD or severe anxiety?  NO, No RN review required.    Are you taking any prescription medications for pain 3 or more times per week?   NO, No RN review required.    Do you have a history of malignant hyperthermia?  No    (Females) Are you currently pregnant?   No     Have you been diagnosed or told you have pulmonary hypertension?   No    Do you have an LVAD?  No    Have you been told you have moderate to severe sleep apnea?  No.    Have you been told you have COPD, asthma, or any other lung disease?  No    Has your doctor ordered any cardiac tests like echo, angiogram, stress test, ablation, or EKG, that you have not completed yet?  No    Do you  have a history of any heart conditions?  Yes - AFIB    Have you had any hospitalizations  in the last year for heart related issues, for example a stent placement, heart attack, or cardiomyopathy?  No    Do you have any implantable devices in your body (pacemaker, ICD)?  No    Do you take nitroglycerine?  No    Have you ever had " "or are you waiting for an organ transplant?  No. Continue scheduling, no site restrictions.    Have you had a stroke or transient ischemic attack (TIA aka \"mini stroke\") in the last 2 years?   No.    Have you been diagnosed with or been told you have cirrhosis of the liver?   No.    Are you currently on dialysis?   No    Do you need assistance transferring?   No    BMI: Estimated body mass index is 33.99 kg/m  as calculated from the following:    Height as of 2/13/24: 1.93 m (6' 4\").    Weight as of 5/23/24: 126.6 kg (279 lb 3.2 oz).     Is patients BMI > 40 and scheduling location [facility]?  No    Do you take an injectable or oral medication for weight loss or diabetes (excluding insulin)?  Yes, hold time can be up to 7 days. Please consult with you prescribing provider to discuss endoscopy recommendations. (Please schedule at least 7 days out.)    Do you take the medication Naltrexone?  No    Do you take blood thinners?  No       Prep   Are you currently on dialysis or do you have chronic kidney disease?  No    Do you have a diagnosis of diabetes?  No    Do you have a diagnosis of cystic fibrosis (CF)?  No    On a regular basis do you go 3 -5 days between bowel movements?  No    BMI > 40?  No    Preferred Pharmacy:    [id number] [place] [address] [address] [place] [address] [facility] [address]  Phone: [phone] Fax: [phone]      Patient requested [facility] and call transferred to [facility]  "

## 2025-03-03 NOTE — PROGRESS NOTES
HEART CARE ENCOUNTER CONSULTATON NOTE      [facility]  [phone]      Assessment/Recommendations   Assessment:   Paroxysmal Atrial fibrillation/flutter: not on anticoagulation with [id number] of 1 (HTN), no known recurrence and has not needed metoprolol tartrate (25 mg) following cardioversion 12/23/2023. ZIO monitor 5/2024 showed no Atrial fibrillation or other rhythm disturbance.  Ascending aortic dilation/LVH: 4.1 cm on echo with BP now well-controlled  Hypertension: Controlled on losartan 25 mg, hydrochlorothiazide 12.5 mg - BMP WNL  Situational anxiety: as needed propranolol 10 mg as prophylactic     Plan:   Blood pressure well-controlled on losartan and HCTZ, repeat echocardiogram for aortic dilation monitoring in 1 year  Repeat echo monitoring of aortic dilation and LVH  With suspected A fib recurrence will monitor with [employer] Mobile      Follow up in 1 year or sooner as needed      History of Present Illness/Subjective    HPI: Krystian Ortega is a 43 year old male with PMHx of atrial flutter, ascending aortic dilation, HTN, anxiety presents for follow up.  Patient reports no concerns.  He has not been watching his blood pressure, but is well-controlled today.  He did wash it initially after adjustments to antihypertensive regimen last year and it was well-controlled.  He denies any new chest pain, shortness of breath, lower extremity swelling, lightheadedness, fatigue.  He has had no recurrent atrial fibrillation to his knowledge since last cardiology visit following initial presentation 12/23/2023, and 1 short episode afterward that involved lightheadedness, heart racing sensation.  He has blood pressure cuff and Kardia mobile device at home for monitoring.      He denies shortness of breath, dyspnea on exertion, orthopnea, and lower extremity edema.          Zio monitoring from 6/17/2024 to 6/24/2024 (duration 7d 0h).  Predominant rhythm was sinus rhythm, 47 to 139bpm, average  "65bpm.  No nonsustained or sustained tachyarrhythmias.  No atrial fibrillation.  There were no pauses of greater than 3 seconds.  Rare supraventricular ectopic beats (isolated <1%).  Rare premature ventricular contractions (isolated <1%).  No symptoms triggered    Echocardiogram 1/19/2024 Results:  Left ventricular size, wall motion and function are normal. The ejection  fraction is 60-65%.  Normal right ventricle size and systolic function.  The left atrium is mildly dilated.  Ascending Aorta dilatation is present(4.1cm).  No hemodynamically significant valvular abnormalities on 2D or color flow  imaging.     Physical Examination  Review of Systems   Vitals: /76 (BP Location: Left arm, Patient Position: Sitting, Cuff Size: Adult Large)   Pulse 68   Resp 16   Ht 1.93 m (6' 4\")   Wt 124.3 kg (274 lb)   BMI 33.35 kg/m    BMI= Body mass index is 33.35 kg/m .  Wt Readings from Last 3 Encounters:   03/04/25 124.3 kg (274 lb)   05/23/24 126.6 kg (279 lb 3.2 oz)   02/23/24 125.4 kg (276 lb 8 oz)           ENT/Mouth: membranes moist, no oral lesions or bleeding gums.      EYES:  no scleral icterus, normal conjunctivae       Chest/Lungs:   lungs are clear to auscultation, no rales or wheezing, equal chest wall expansion    Cardiovascular:   Regular. Normal first and second heart sounds with rubs, or gallops; the carotid, radial and posterior tibial pulses are intact, absent edema bilaterally        Extremities: no cyanosis or clubbing   Skin: no xanthelasma, warm.    Neurologic: no tremors     Psychiatric: alert and oriented x3, calm        Please refer above for cardiac ROS details.        Medical History  Surgical History Family History Social History   No past medical history on file.  Past Surgical History:   Procedure Laterality Date    ORCHIOPEXY       Family History   Problem Relation Age of Onset    No Known Problems Mother     No Known Problems Father     No Known Problems Sister     No Known Problems " Brother         Social History     Socioeconomic History    Marital status:      Spouse name: Not on file    Number of children: Not on file    Years of education: Not on file    Highest education level: Not on file   Occupational History    Not on file   Tobacco Use    Smoking status: Never     Passive exposure: Past    Smokeless tobacco: Former   Vaping Use    Vaping status: Never Used   Substance and Sexual Activity    Alcohol use: Yes     Alcohol/week: 4.2 - 8.3 standard drinks of alcohol    Drug use: Yes     Comment: Drug use: nicotine tablets    Sexual activity: Not on file   Other Topics Concern    Not on file   Social History Narrative    Not on file     Social Drivers of Health     Financial Resource Strain: Not on file   Food Insecurity: Not on file   Transportation Needs: Not on file   Physical Activity: Not on file   Stress: Not on file   Social Connections: Not on file   Interpersonal Safety: Low Risk  (2/13/2024)    Interpersonal Safety     Do you feel physically and emotionally safe where you currently live?: Yes     Within the past 12 months, have you been hit, slapped, kicked or otherwise physically hurt by someone?: No     Within the past 12 months, have you been humiliated or emotionally abused in other ways by your partner or ex-partner?: No   Housing Stability: Not on file           Medications  Allergies   Current Outpatient Medications   Medication Sig Dispense Refill    albuterol (PROAIR HFA/PROVENTIL HFA/VENTOLIN HFA) 108 (90 Base) MCG/ACT inhaler Inhale 2 puffs into the lungs as needed.      benzonatate (TESSALON) 100 MG capsule Take 100 mg by mouth 2 times daily as needed.      busPIRone (BUSPAR) 15 MG tablet Take 1 tablet (15 mg) by mouth 2 times daily. 180 tablet 1    DULoxetine (CYMBALTA) 30 MG capsule TAKE 1 CAPSULE BY MOUTH EVERY DAY 90 capsule 1    hydroCHLOROthiazide 12.5 MG tablet Take 1 tablet (12.5 mg) by mouth daily. 90 tablet 3    losartan (COZAAR) 25 MG tablet Take 1  "tablet (25 mg) by mouth daily. 90 tablet 3    metoprolol tartrate (LOPRESSOR) 25 MG tablet Take 1 tablet (25 mg) by mouth 2 times daily as needed (For increase heart rates.)      propranolol (INDERAL) 40 MG tablet Take 1 tablets 30-60 minutes prior to anxiety provoking event 30 tablet 1       Allergies   Allergen Reactions    Cats Itching     Itchy eyes and sneezy          Lab Results    Chemistry/lipid CBC Cardiac Enzymes/BNP/TSH/INR   No results for input(s): \"CHOL\", \"HDL\", \"LDL\", \"TRIG\", \"CHOLHDLRATIO\" in the last 95996 hours.  No results for input(s): \"LDL\" in the last 42239 hours.  Recent Labs   Lab Test 01/31/24  0829      POTASSIUM 4.2   CHLORIDE 101   CO2 28   GLC 72   BUN 14.8   CR 1.06   GFRESTIMATED 89   ESDRAS 9.8     Recent Labs   Lab Test 01/31/24  0829 12/23/23  1534   CR 1.06 1.10     No results for input(s): \"A1C\" in the last 05910 hours.       Recent Labs   Lab Test 12/23/23  1534   WBC 10.0   HGB 17.7   HCT 49.9   MCV 90        Recent Labs   Lab Test 12/23/23  1534   HGB 17.7    No results for input(s): \"TROPONINI\" in the last 39019 hours.  No results for input(s): \"BNP\", \"NTBNPI\", \"NTBNP\" in the last 99410 hours.  Recent Labs   Lab Test 12/23/23  1534   TSH 2.09     No results for input(s): \"INR\" in the last 29066 hours.       This note has been dictated using voice recognition software. Any grammatical, typographical, or context distortions are unintentional and inherent to the software    Rupali Rollins PA-C                                       "

## 2025-03-04 ENCOUNTER — OFFICE VISIT (OUTPATIENT)
Dept: CARDIOLOGY | Facility: CLINIC | Age: 45
End: 2025-03-04
Payer: COMMERCIAL

## 2025-03-04 VITALS
WEIGHT: 274 LBS | SYSTOLIC BLOOD PRESSURE: 118 MMHG | HEIGHT: 76 IN | BODY MASS INDEX: 33.36 KG/M2 | RESPIRATION RATE: 16 BRPM | DIASTOLIC BLOOD PRESSURE: 76 MMHG | HEART RATE: 68 BPM

## 2025-03-04 DIAGNOSIS — I51.7 LEFT VENTRICULAR HYPERTROPHY: ICD-10-CM

## 2025-03-04 DIAGNOSIS — I77.810 ASCENDING AORTA DILATATION: Primary | ICD-10-CM

## 2025-03-04 DIAGNOSIS — I48.0 PAROXYSMAL ATRIAL FIBRILLATION (H): ICD-10-CM

## 2025-03-04 DIAGNOSIS — I10 BENIGN ESSENTIAL HYPERTENSION: ICD-10-CM

## 2025-03-04 LAB
ANION GAP SERPL CALCULATED.3IONS-SCNC: 11 MMOL/L (ref 7–15)
BUN SERPL-MCNC: 9.6 MG/DL (ref 6–20)
CALCIUM SERPL-MCNC: 9.6 MG/DL (ref 8.8–10.4)
CHLORIDE SERPL-SCNC: 100 MMOL/L (ref 98–107)
CREAT SERPL-MCNC: 0.89 MG/DL (ref 0.67–1.17)
EGFRCR SERPLBLD CKD-EPI 2021: >90 ML/MIN/1.73M2
GLUCOSE SERPL-MCNC: 82 MG/DL (ref 70–99)
HCO3 SERPL-SCNC: 27 MMOL/L (ref 22–29)
POTASSIUM SERPL-SCNC: 4 MMOL/L (ref 3.4–5.3)
SODIUM SERPL-SCNC: 138 MMOL/L (ref 135–145)

## 2025-03-04 PROCEDURE — 36415 COLL VENOUS BLD VENIPUNCTURE: CPT

## 2025-03-04 PROCEDURE — G2211 COMPLEX E/M VISIT ADD ON: HCPCS

## 2025-03-04 PROCEDURE — 99214 OFFICE O/P EST MOD 30 MIN: CPT

## 2025-03-04 PROCEDURE — 80048 BASIC METABOLIC PNL TOTAL CA: CPT

## 2025-03-04 RX ORDER — LOSARTAN POTASSIUM 25 MG/1
25 TABLET ORAL DAILY
Qty: 90 TABLET | Refills: 3 | Status: SHIPPED | OUTPATIENT
Start: 2025-03-04

## 2025-03-04 RX ORDER — HYDROCHLOROTHIAZIDE 12.5 MG/1
12.5 TABLET ORAL DAILY
Qty: 90 TABLET | Refills: 3 | Status: SHIPPED | OUTPATIENT
Start: 2025-03-04

## 2025-03-04 NOTE — LETTER
3/4/2025    Ashish Grace, CNP  [address]  [place] [address]    RE: Krystian Ortega       Dear Colleague,     I had the pleasure of seeing Krystian Ortega in the [facility].    HEART CARE ENCOUNTER CONSULTATON NOTE      [facility]  [phone]      Assessment/Recommendations   Assessment:   Paroxysmal Atrial fibrillation/flutter: not on anticoagulation with [id number] of 1 (HTN), no known recurrence and has not needed metoprolol tartrate (25 mg) following cardioversion 12/23/2023. ZIO monitor 5/2024 showed no Atrial fibrillation or other rhythm disturbance.  Ascending aortic dilation/LVH: 4.1 cm on echo with BP now well-controlled  Hypertension: Controlled on losartan 25 mg, hydrochlorothiazide 12.5 mg - BMP WNL  Situational anxiety: as needed propranolol 10 mg as prophylactic     Plan:   Blood pressure well-controlled on losartan and HCTZ, repeat echocardiogram for aortic dilation monitoring in 1 year  Repeat echo monitoring of aortic dilation and LVH  With suspected A fib recurrence will monitor with [employer]      Follow up in 1 year or sooner as needed      History of Present Illness/Subjective    HPI: Krystian Ortega is a 43 year old male with PMHx of atrial flutter, ascending aortic dilation, HTN, anxiety presents for follow up.  Patient reports no concerns.  He has not been watching his blood pressure, but is well-controlled today.  He did wash it initially after adjustments to antihypertensive regimen last year and it was well-controlled.  He denies any new chest pain, shortness of breath, lower extremity swelling, lightheadedness, fatigue.  He has had no recurrent atrial fibrillation to his knowledge since last cardiology visit following initial presentation 12/23/2023, and 1 short episode afterward that involved lightheadedness, heart racing sensation.  He has blood pressure cuff and Kardia mobile device at home for monitoring.      He  "denies shortness of breath, dyspnea on exertion, orthopnea, and lower extremity edema.          Zio monitoring from 6/17/2024 to 6/24/2024 (duration 7d 0h).  Predominant rhythm was sinus rhythm, 47 to 139bpm, average 65bpm.  No nonsustained or sustained tachyarrhythmias.  No atrial fibrillation.  There were no pauses of greater than 3 seconds.  Rare supraventricular ectopic beats (isolated <1%).  Rare premature ventricular contractions (isolated <1%).  No symptoms triggered    Echocardiogram 1/19/2024 Results:  Left ventricular size, wall motion and function are normal. The ejection  fraction is 60-65%.  Normal right ventricle size and systolic function.  The left atrium is mildly dilated.  Ascending Aorta dilatation is present(4.1cm).  No hemodynamically significant valvular abnormalities on 2D or color flow  imaging.     Physical Examination  Review of Systems   Vitals: /76 (BP Location: Left arm, Patient Position: Sitting, Cuff Size: Adult Large)   Pulse 68   Resp 16   Ht 1.93 m (6' 4\")   Wt 124.3 kg (274 lb)   BMI 33.35 kg/m    BMI= Body mass index is 33.35 kg/m .  Wt Readings from Last 3 Encounters:   03/04/25 124.3 kg (274 lb)   05/23/24 126.6 kg (279 lb 3.2 oz)   02/23/24 125.4 kg (276 lb 8 oz)           ENT/Mouth: membranes moist, no oral lesions or bleeding gums.      EYES:  no scleral icterus, normal conjunctivae       Chest/Lungs:   lungs are clear to auscultation, no rales or wheezing, equal chest wall expansion    Cardiovascular:   Regular. Normal first and second heart sounds with rubs, or gallops; the carotid, radial and posterior tibial pulses are intact, absent edema bilaterally        Extremities: no cyanosis or clubbing   Skin: no xanthelasma, warm.    Neurologic: no tremors     Psychiatric: alert and oriented x3, calm        Please refer above for cardiac ROS details.        Medical History  Surgical History Family History Social History   No past medical history on file.  Past " Surgical History:   Procedure Laterality Date     ORCHIOPEXY       Family History   Problem Relation Age of Onset     No Known Problems Mother      No Known Problems Father      No Known Problems Sister      No Known Problems Brother         Social History     Socioeconomic History     Marital status:      Spouse name: Not on file     Number of children: Not on file     Years of education: Not on file     Highest education level: Not on file   Occupational History     Not on file   Tobacco Use     Smoking status: Never     Passive exposure: Past     Smokeless tobacco: Former   Vaping Use     Vaping status: Never Used   Substance and Sexual Activity     Alcohol use: Yes     Alcohol/week: 4.2 - 8.3 standard drinks of alcohol     Drug use: Yes     Comment: Drug use: nicotine tablets     Sexual activity: Not on file   Other Topics Concern     Not on file   Social History Narrative     Not on file     Social Drivers of Health     Financial Resource Strain: Not on file   Food Insecurity: Not on file   Transportation Needs: Not on file   Physical Activity: Not on file   Stress: Not on file   Social Connections: Not on file   Interpersonal Safety: Low Risk  (2/13/2024)    Interpersonal Safety      Do you feel physically and emotionally safe where you currently live?: Yes      Within the past 12 months, have you been hit, slapped, kicked or otherwise physically hurt by someone?: No      Within the past 12 months, have you been humiliated or emotionally abused in other ways by your partner or ex-partner?: No   Housing Stability: Not on file           Medications  Allergies   Current Outpatient Medications   Medication Sig Dispense Refill     albuterol (PROAIR HFA/PROVENTIL HFA/VENTOLIN HFA) 108 (90 Base) MCG/ACT inhaler Inhale 2 puffs into the lungs as needed.       benzonatate (TESSALON) 100 MG capsule Take 100 mg by mouth 2 times daily as needed.       busPIRone (BUSPAR) 15 MG tablet Take 1 tablet (15 mg) by mouth 2  "times daily. 180 tablet 1     DULoxetine (CYMBALTA) 30 MG capsule TAKE 1 CAPSULE BY MOUTH EVERY DAY 90 capsule 1     hydroCHLOROthiazide 12.5 MG tablet Take 1 tablet (12.5 mg) by mouth daily. 90 tablet 3     losartan (COZAAR) 25 MG tablet Take 1 tablet (25 mg) by mouth daily. 90 tablet 3     metoprolol tartrate (LOPRESSOR) 25 MG tablet Take 1 tablet (25 mg) by mouth 2 times daily as needed (For increase heart rates.)       propranolol (INDERAL) 40 MG tablet Take 1 tablets 30-60 minutes prior to anxiety provoking event 30 tablet 1       Allergies   Allergen Reactions     Cats Itching     Itchy eyes and sneezy          Lab Results    Chemistry/lipid CBC Cardiac Enzymes/BNP/TSH/INR   No results for input(s): \"CHOL\", \"HDL\", \"LDL\", \"TRIG\", \"CHOLHDLRATIO\" in the last 69000 hours.  No results for input(s): \"LDL\" in the last 96292 hours.  Recent Labs   Lab Test 01/31/24  0829      POTASSIUM 4.2   CHLORIDE 101   CO2 28   GLC 72   BUN 14.8   CR 1.06   GFRESTIMATED 89   ESDRAS 9.8     Recent Labs   Lab Test 01/31/24  0829 12/23/23  1534   CR 1.06 1.10     No results for input(s): \"A1C\" in the last 84843 hours.       Recent Labs   Lab Test 12/23/23  1534   WBC 10.0   HGB 17.7   HCT 49.9   MCV 90        Recent Labs   Lab Test 12/23/23  1534   HGB 17.7    No results for input(s): \"TROPONINI\" in the last 25005 hours.  No results for input(s): \"BNP\", \"NTBNPI\", \"NTBNP\" in the last 55418 hours.  Recent Labs   Lab Test 12/23/23  1534   TSH 2.09     No results for input(s): \"INR\" in the last 06029 hours.       This note has been dictated using voice recognition software. Any grammatical, typographical, or context distortions are unintentional and inherent to the software    Rupali Rollins PA-C                                         Thank you for allowing me to participate in the care of your patient.      Sincerely,     [facility]ABRIEL [facility]  cc: "   Rupali Rollins PA-C  [address]  [address]  [place] [address]

## 2025-03-04 NOTE — LETTER
March 5, 2025      Krystian Ortega  [address] [address]        Dear ,    We are writing to inform you of your test results.    Rupali Rollins PA-C  3/4/2025 12:34 PM CST Back to Top      No changes.       Resulted Orders   Basic metabolic panel   Result Value Ref Range    Sodium 138 135 - 145 mmol/L    Potassium 4.0 3.4 - 5.3 mmol/L    Chloride 100 98 - 107 mmol/L    Carbon Dioxide (CO2) 27 22 - 29 mmol/L    Anion Gap 11 7 - 15 mmol/L    Urea Nitrogen 9.6 6.0 - 20.0 mg/dL    Creatinine 0.89 0.67 - 1.17 mg/dL    GFR Estimate >90 >60 mL/min/1.73m2      Comment:      eGFR calculated using 2021 CKD-EPI equation.    Calcium 9.6 8.8 - 10.4 mg/dL    Glucose 82 70 - 99 mg/dL       If you have any questions or concerns, please call the clinic at the number listed above.       Sincerely,      Rupali Rollins PA-C    Electronically signed

## 2025-03-16 ENCOUNTER — HEALTH MAINTENANCE LETTER (OUTPATIENT)
Age: 45
End: 2025-03-16

## 2025-04-13 DIAGNOSIS — F41.8 PERFORMANCE ANXIETY: ICD-10-CM

## 2025-04-14 RX ORDER — PROPRANOLOL HYDROCHLORIDE 40 MG/1
TABLET ORAL
Qty: 30 TABLET | Refills: 1 | Status: SHIPPED | OUTPATIENT
Start: 2025-04-14

## 2025-05-02 PROBLEM — D12.6 ADENOMATOUS POLYP OF COLON: Status: ACTIVE | Noted: 2025-05-02

## 2025-05-21 ENCOUNTER — MYC MEDICAL ADVICE (OUTPATIENT)
Dept: INTERNAL MEDICINE | Facility: CLINIC | Age: 45
End: 2025-05-21
Payer: COMMERCIAL

## 2025-05-21 DIAGNOSIS — G47.30 SLEEP APNEA, UNSPECIFIED TYPE: ICD-10-CM

## 2025-05-21 DIAGNOSIS — R53.83 TIREDNESS: Primary | ICD-10-CM

## 2025-05-21 DIAGNOSIS — G47.30 SLEEP APNEA: ICD-10-CM

## 2025-05-21 DIAGNOSIS — E66.09 CLASS 2 OBESITY DUE TO EXCESS CALORIES WITHOUT SERIOUS COMORBIDITY WITH BODY MASS INDEX (BMI) OF 35.0 TO 35.9 IN ADULT: ICD-10-CM

## 2025-05-21 DIAGNOSIS — E66.812 CLASS 2 OBESITY DUE TO EXCESS CALORIES WITHOUT SERIOUS COMORBIDITY WITH BODY MASS INDEX (BMI) OF 35.0 TO 35.9 IN ADULT: ICD-10-CM

## 2025-05-22 ENCOUNTER — PATIENT OUTREACH (OUTPATIENT)
Dept: CARE COORDINATION | Facility: CLINIC | Age: 45
End: 2025-05-22
Payer: COMMERCIAL

## 2025-06-07 DIAGNOSIS — F41.8 PERFORMANCE ANXIETY: ICD-10-CM

## 2025-06-09 RX ORDER — PROPRANOLOL HYDROCHLORIDE 40 MG/1
TABLET ORAL
Qty: 30 TABLET | Refills: 1 | Status: SHIPPED | OUTPATIENT
Start: 2025-06-09

## 2025-07-05 DIAGNOSIS — F41.9 ANXIETY AND DEPRESSION: ICD-10-CM

## 2025-07-05 DIAGNOSIS — F32.A ANXIETY AND DEPRESSION: ICD-10-CM

## 2025-07-07 RX ORDER — DULOXETIN HYDROCHLORIDE 30 MG/1
30 CAPSULE, DELAYED RELEASE ORAL DAILY
Qty: 30 CAPSULE | Refills: 5 | Status: SHIPPED | OUTPATIENT
Start: 2025-07-07

## 2025-08-02 DIAGNOSIS — F41.9 ANXIETY AND DEPRESSION: ICD-10-CM

## 2025-08-02 DIAGNOSIS — F41.8 PERFORMANCE ANXIETY: ICD-10-CM

## 2025-08-02 DIAGNOSIS — F32.A ANXIETY AND DEPRESSION: ICD-10-CM

## 2025-08-04 RX ORDER — PROPRANOLOL HYDROCHLORIDE 40 MG/1
TABLET ORAL
Qty: 30 TABLET | Refills: 1 | Status: SHIPPED | OUTPATIENT
Start: 2025-08-04

## 2025-08-04 RX ORDER — BUSPIRONE HYDROCHLORIDE 15 MG/1
15 TABLET ORAL 2 TIMES DAILY
Qty: 60 TABLET | Refills: 5 | Status: SHIPPED | OUTPATIENT
Start: 2025-08-04